# Patient Record
Sex: FEMALE | Race: WHITE | Employment: OTHER | URBAN - METROPOLITAN AREA
[De-identification: names, ages, dates, MRNs, and addresses within clinical notes are randomized per-mention and may not be internally consistent; named-entity substitution may affect disease eponyms.]

---

## 2017-01-17 ENCOUNTER — TELEPHONE (OUTPATIENT)
Dept: INTERNAL MEDICINE CLINIC | Age: 62
End: 2017-01-17

## 2017-01-17 NOTE — TELEPHONE ENCOUNTER
Reviewed discharge summary from hospitalization at [facility] on 1/13/2017. Patient was discharged on both Effexor and Zyprexa. She was given a prescription for Zyprexa 30 tablets with 2 refills. Not sure what she is requesting. Please clarify.

## 2017-01-17 NOTE — TELEPHONE ENCOUNTER
Pt called asking to speak to a nurse. Says she needs to give update on her condition and will only give to a nurse.

## 2017-01-17 NOTE — TELEPHONE ENCOUNTER
Spoke with pt, she stated that she is trying to gain her weight back by drinking boost daily. She can not get in to see psychiatrist because they are all book. She would like to continue to take Huber Slaughter , she believe it helped her when she was in the hospital. She stated she want to get her life back.

## 2017-01-18 RX ORDER — OLANZAPINE 5 MG/1
5 TABLET ORAL
Qty: 30 TAB | Refills: 3 | Status: SHIPPED | OUTPATIENT
Start: 2017-01-18 | End: 2017-03-28 | Stop reason: SDUPTHER

## 2017-02-09 RX ORDER — OMEPRAZOLE 20 MG/1
20 CAPSULE, DELAYED RELEASE ORAL DAILY
Qty: 30 CAP | Refills: 3 | Status: SHIPPED | OUTPATIENT
Start: 2017-02-09 | End: 2017-06-23 | Stop reason: SDUPTHER

## 2017-03-28 RX ORDER — OLANZAPINE 5 MG/1
5 TABLET ORAL
Qty: 30 TAB | Refills: 3 | Status: SHIPPED | OUTPATIENT
Start: 2017-03-28 | End: 2018-01-21 | Stop reason: ALTCHOICE

## 2017-03-29 ENCOUNTER — TELEPHONE (OUTPATIENT)
Dept: INTERNAL MEDICINE CLINIC | Age: 62
End: 2017-03-29

## 2017-04-04 ENCOUNTER — TELEPHONE (OUTPATIENT)
Dept: INTERNAL MEDICINE CLINIC | Age: 62
End: 2017-04-04

## 2017-04-04 DIAGNOSIS — E55.9 VITAMIN D INSUFFICIENCY: ICD-10-CM

## 2017-04-04 DIAGNOSIS — E78.5 DYSLIPIDEMIA: ICD-10-CM

## 2017-04-04 DIAGNOSIS — I10 ESSENTIAL HYPERTENSION: ICD-10-CM

## 2017-04-04 DIAGNOSIS — Z00.00 ROUTINE GENERAL MEDICAL EXAMINATION AT A HEALTH CARE FACILITY: Primary | ICD-10-CM

## 2017-04-05 ENCOUNTER — TELEPHONE (OUTPATIENT)
Dept: INTERNAL MEDICINE CLINIC | Age: 62
End: 2017-04-05

## 2017-04-14 ENCOUNTER — TELEPHONE (OUTPATIENT)
Dept: INTERNAL MEDICINE CLINIC | Age: 62
End: 2017-04-14

## 2017-04-14 NOTE — TELEPHONE ENCOUNTER
Call from Malcom Saunders at [facility].  Pt is there they are req lab order to be faxed to them at [phone], Natalie Mariscal

## 2017-05-04 ENCOUNTER — TELEPHONE (OUTPATIENT)
Dept: INTERNAL MEDICINE CLINIC | Age: 62
End: 2017-05-04

## 2017-05-04 RX ORDER — FLUCONAZOLE 150 MG/1
150 TABLET ORAL DAILY
Qty: 1 TAB | Refills: 0 | Status: SHIPPED | OUTPATIENT
Start: 2017-05-04 | End: 2017-05-05

## 2017-05-04 NOTE — TELEPHONE ENCOUNTER
Patient stating she has a yeast infection and wants to know if something can be called. She does not want to see Dionne Salguero for this unless she absolutely has to.

## 2017-05-04 NOTE — TELEPHONE ENCOUNTER
Prescription for fluconazole x 1 dose sent to [facility]. Please have patient come in for appointment if no improvement following treatment. Maryse Becerra

## 2017-05-04 NOTE — TELEPHONE ENCOUNTER
LM notifying that new script was sent to [facility] and call if she doesn't have improvement.

## 2017-05-15 RX ORDER — VENLAFAXINE 75 MG/1
75 TABLET ORAL 3 TIMES DAILY
Status: CANCELLED | OUTPATIENT
Start: 2017-05-15

## 2017-05-15 NOTE — TELEPHONE ENCOUNTER
Pt stated her counselor  has prescribed this but needs a refill and was told to call her pcp for refill.   Pt has appt with counselor in June will call to see if that doctor can refill, if not will call our office back

## 2017-05-15 NOTE — TELEPHONE ENCOUNTER
Please clarify with pharmacy the current dosing of her Effexor as prescribed by her counselor. [facility] ED records from 1/2017 list her on the extended release form (75 mg) 3 tablets in the morning.

## 2017-05-15 NOTE — TELEPHONE ENCOUNTER
Pt called back, she cant get in touch with her consoler is out of meds and don't want to miss a dose. Can we call into pharmacy?

## 2017-05-16 RX ORDER — VENLAFAXINE HYDROCHLORIDE 75 MG/1
225 CAPSULE, EXTENDED RELEASE ORAL DAILY
Qty: 90 CAP | Refills: 2 | Status: SHIPPED | OUTPATIENT
Start: 2017-05-16 | End: 2018-07-18

## 2017-05-22 RX ORDER — ALPRAZOLAM 0.25 MG/1
0.25 TABLET ORAL
Qty: 30 TAB | Refills: 0 | OUTPATIENT
Start: 2017-05-22 | End: 2017-06-20 | Stop reason: SDUPTHER

## 2017-05-22 NOTE — TELEPHONE ENCOUNTER
Reviewed report generated by the [facility]. Does not demonstrate aberrancies or inconsistencies with regard to the prescribing of controlled medications to this patient by other providers. Last filled 12/1/2016 as per .

## 2017-05-30 ENCOUNTER — TELEPHONE (OUTPATIENT)
Dept: INTERNAL MEDICINE CLINIC | Age: 62
End: 2017-05-30

## 2017-06-01 ENCOUNTER — TELEPHONE (OUTPATIENT)
Dept: INTERNAL MEDICINE CLINIC | Age: 62
End: 2017-06-01

## 2017-06-20 ENCOUNTER — OFFICE VISIT (OUTPATIENT)
Dept: INTERNAL MEDICINE CLINIC | Age: 62
End: 2017-06-20

## 2017-06-20 ENCOUNTER — TELEPHONE (OUTPATIENT)
Dept: INTERNAL MEDICINE CLINIC | Age: 62
End: 2017-06-20

## 2017-06-20 DIAGNOSIS — F33.42 RECURRENT MAJOR DEPRESSIVE DISORDER, IN FULL REMISSION (HCC): ICD-10-CM

## 2017-06-20 DIAGNOSIS — K21.9 GASTROESOPHAGEAL REFLUX DISEASE, ESOPHAGITIS PRESENCE NOT SPECIFIED: ICD-10-CM

## 2017-06-20 DIAGNOSIS — E78.5 DYSLIPIDEMIA: ICD-10-CM

## 2017-06-20 DIAGNOSIS — Z72.51 UNPROTECTED SEXUAL INTERCOURSE: ICD-10-CM

## 2017-06-20 DIAGNOSIS — R73.09 ABNORMAL GLUCOSE: ICD-10-CM

## 2017-06-20 DIAGNOSIS — C67.9 MALIGNANT NEOPLASM OF URINARY BLADDER, UNSPECIFIED SITE (HCC): ICD-10-CM

## 2017-06-20 DIAGNOSIS — I10 ESSENTIAL HYPERTENSION: Primary | ICD-10-CM

## 2017-06-20 DIAGNOSIS — M85.89 OSTEOPENIA OF MULTIPLE SITES: ICD-10-CM

## 2017-06-20 DIAGNOSIS — Z23 ENCOUNTER FOR IMMUNIZATION: ICD-10-CM

## 2017-06-20 LAB
ANION GAP SERPL CALC-SCNC: 15 MMOL/L
BUN SERPL-MCNC: 9 MG/DL (ref 6–22)
CALCIUM SERPL-MCNC: 9.6 MG/DL (ref 8.4–10.5)
CHLORIDE SERPL-SCNC: 100 MMOL/L (ref 98–110)
CO2 SERPL-SCNC: 25 MMOL/L (ref 20–32)
CREAT SERPL-MCNC: 0.7 MG/DL (ref 0.8–1.4)
GFRAA, 66117: >60
GFRNA, 66118: >60
GLUCOSE SERPL-MCNC: 89 MG/DL (ref 65–99)
POTASSIUM SERPL-SCNC: 4.4 MMOL/L (ref 3.5–5.5)
SODIUM SERPL-SCNC: 140 MMOL/L (ref 133–145)

## 2017-06-20 RX ORDER — ALPRAZOLAM 0.25 MG/1
0.25 TABLET ORAL
Qty: 30 TAB | Refills: 0 | Status: SHIPPED | OUTPATIENT
Start: 2017-06-20 | End: 2017-08-07 | Stop reason: SDUPTHER

## 2017-06-20 NOTE — MR AVS SNAPSHOT
Visit Information Date & Time Provider Department Dept. Phone Encounter #  
 6/20/2017 12:00 PM Karen Mead MD Internist of [facility] [phone] [id number] Follow-up Instructions Return in about 6 months (around 12/20/2017), or if symptoms worsen or fail to improve. Your Appointments 12/21/2017 11:00 AM  
Office Visit with Kaern Mead MD  
Internist of [facility] Appt Note: 6 months [address], [address] [place]  
  
   
 [address], [address] Upcoming Health Maintenance Date Due Pneumococcal 19-64 Highest Risk (1 of 3 - PCV13) 10/18/1974 DTaP/Tdap/Td series (1 - Tdap) 2/27/2015 INFLUENZA AGE 9 TO ADULT 8/1/2017 BREAST CANCER SCRN MAMMOGRAM 3/15/2018 PAP AKA CERVICAL CYTOLOGY 9/14/2018 COLONOSCOPY 4/23/2019 Allergies as of 6/20/2017  Review Complete On: 6/20/2017 By: Real Zarco LPN Severity Noted Reaction Type Reactions Codeine    Nausea and Vomiting Macrobid [Nitrofurantoin Monohyd/m-cryst]  09/04/2015    Nausea and Vomiting Current Immunizations  Reviewed on 5/13/2016 Name Date PPD 9/6/2011 TD Vaccine 7/27/2007 Td 2/26/2015 Tdap 6/20/2017 Zoster Vaccine, Live 5/13/2016  3:20 PM  
  
 Not reviewed this visit You Were Diagnosed With   
  
 Codes Comments Encounter for immunization    -  Primary ICD-10-CM: N33 ICD-9-CM: V03.89 Abnormal glucose     ICD-10-CM: R73.09 
ICD-9-CM: 790.29 Vitals BP Pulse Temp Resp Height(growth percentile) Weight(growth percentile) 140/88 (BP 1 Location: Left arm, BP Patient Position: Sitting) 78 98.4 °F (36.9 °C) (Oral) 18 5' 6\" (1.676 m) 168 lb (76.2 kg) SpO2 BMI OB Status Smoking Status 99% 27.12 kg/m2 Postmenopausal Never Smoker Vitals History BMI and BSA Data  Body Mass Index Body Surface Area  
 27.12 kg/m 2 1.88 m 2  
  
 Preferred Pharmacy Pharmacy Name Phone [facility] [address] [facility], [address] [place] [address] [phone] Your Updated Medication List  
  
   
This list is accurate as of: 6/20/17  1:02 PM.  Always use your most recent med list.  
  
  
  
  
 ALPRAZolam 0.25 mg tablet Commonly known as:  Pama Adryan Take 1 Tab by mouth three (3) times daily as needed for Anxiety. Max Daily Amount: 0.75 mg.  
  
 chromium-herb no.238 250-775 mcg-mg Tab Take  by mouth.  
  
 ciclopirox 8 % solution Commonly known as:  PENLAC Apply to toenail every bedtime  
  
 lisinopril 2.5 mg tablet Commonly known as:  PRINIVIL, ZESTRIL  
TAKE ONE TABLET BY MOUTH EVERY DAY  
  
 OLANZapine 5 mg tablet Commonly known as:  ZyPREXA Take 1 Tab by mouth nightly. omeprazole 20 mg capsule Commonly known as:  PRILOSEC Take 1 Cap by mouth daily. red yeast rice extract 600 mg Cap Take 600 mg by mouth now. venlafaxine-SR 75 mg capsule Commonly known as:  EFFEXOR-XR Take 3 Caps by mouth daily. Prescriptions Printed Refills ALPRAZolam (XANAX) 0.25 mg tablet 0 Sig: Take 1 Tab by mouth three (3) times daily as needed for Anxiety. Max Daily Amount: 0.75 mg. Class: Print Route: Oral  
  
We Performed the Following TETANUS, DIPHTHERIA TOXOIDS AND ACELLULAR PERTUSSIS VACCINE (TDAP), IN INDIVIDS. >=7, IM [id number] CPT(R)] Follow-up Instructions Return in about 6 months (around 12/20/2017), or if symptoms worsen or fail to improve. To-Do List   
 06/20/2017 Lab:  HEMOGLOBIN A1C WITH EAG   
  
 06/20/2017 Lab:  METABOLIC PANEL, BASIC Patient Instructions Please monitor and record your blood pressure every morning. Please notify office if greater than 140/90. Prediabetes: Care Instructions Your Care Instructions Prediabetes is a warning sign that you are at risk for getting type 2 diabetes. It means that your blood sugar is higher than it should be. The food you eat turns into sugar, which your body uses for energy. Normally, an organ called the pancreas makes insulin, which allows the sugar in your blood to get into your body's cells. But when your body can't use insulin the right way, the sugar doesn't move into cells. It stays in your blood instead. This is called insulin resistance. The buildup of sugar in the blood causes prediabetes. The good news is that lifestyle changes may help you get your blood sugar back to normal and help you avoid or delay diabetes. Follow-up care is a key part of your treatment and safety. Be sure to make and go to all appointments, and call your doctor if you are having problems. It's also a good idea to know your test results and keep a list of the medicines you take. How can you care for yourself at home? · Watch your weight. A healthy weight helps your body use insulin properly. · Limit the amount of calories, sweets, and unhealthy fat you eat. Ask your doctor if you should see a dietitian. A registered dietitian can help you create meal plans that fit your lifestyle. · Get at least 30 minutes of exercise on most days of the week. Exercise helps control your blood sugar. It also helps you maintain a healthy weight. Walking is a good choice. You also may want to do other activities, such as running, swimming, cycling, or playing tennis or team sports. · Do not smoke. Smoking can make prediabetes worse. If you need help quitting, talk to your doctor about stop-smoking programs and medicines. These can increase your chances of quitting for good. · If your doctor prescribed medicines, take them exactly as prescribed. Call your doctor if you think you are having a problem with your medicine. You will get more details on the specific medicines your doctor prescribes. When should you call for help? Watch closely for changes in your health, and be sure to contact your doctor if: 
· You have any symptoms of diabetes. These may include: ¨ Being thirsty more often. ¨ Urinating more. ¨ Being hungrier. ¨ Losing weight. ¨ Being very tired. ¨ Having blurry vision. · You have a wound that will not heal. 
· You have an infection that will not go away. · You have problems with your blood pressure. · You want more information about diabetes and how you can keep from getting it. Where can you learn more? Go to http://kasey-yamilet.info/. Enter I222 in the search box to learn more about \"Prediabetes: Care Instructions. \" Current as of: March 13, 2017 Content Version: 11.3 © 1186-3861 [employer]. Care instructions adapted under license by [employer] (which disclaims liability or warranty for this information). If you have questions about a medical condition or this instruction, always ask your healthcare professional. [place] [address] any warranty or liability for your use of this information. Introducing [facility]! [facility] introduces [employer] patient portal. Now you can access parts of your medical record, email your doctor's office, and request medication refills online. 1. In your internet browser, go to [email or web address] 2. Click on the First Time User? Click Here link in the Sign In box. You will see the New Member Sign Up page. 3. Enter your [employer] Access Code exactly as it appears below. You will not need to use this code after youve completed the sign-up process. If you do not sign up before the expiration date, you must request a new code. · [employer] Access Code: [email or web address] Expires: 9/18/2017  1:02 PM 
 
4. Enter the last four digits of your Social Security Number (xxxx) and Date of Birth (mm/dd/yyyy) as indicated and click Submit. You will be taken to the next sign-up page. 5. Create a [employer] ID. This will be your [employer] login ID and cannot be changed, so think of one that is secure and easy to remember. 6. Create a [employer] password. You can change your password at any time. 7. Enter your Password Reset Question and Answer. This can be used at a later time if you forget your password. 8. Enter your e-mail address. You will receive e-mail notification when new information is available in [address] 9. Click Sign Up. You can now view and download portions of your medical record. 10. Click the Download Summary menu link to download a portable copy of your medical information. If you have questions, please visit the Frequently Asked Questions section of the [employer] website. Remember, [employer] is NOT to be used for urgent needs. For medical emergencies, dial 911. Now available from your iPhone and Android! Please provide this summary of care documentation to your next provider. Your primary care clinician is listed as Scot Vincent. If you have any questions after today's visit, please call [phone].

## 2017-06-20 NOTE — PATIENT INSTRUCTIONS
Please monitor and record your blood pressure every morning. Please notify office if greater than 140/90. Prediabetes: Care Instructions  Your Care Instructions  Prediabetes is a warning sign that you are at risk for getting type 2 diabetes. It means that your blood sugar is higher than it should be. The food you eat turns into sugar, which your body uses for energy. Normally, an organ called the pancreas makes insulin, which allows the sugar in your blood to get into your body's cells. But when your body can't use insulin the right way, the sugar doesn't move into cells. It stays in your blood instead. This is called insulin resistance. The buildup of sugar in the blood causes prediabetes. The good news is that lifestyle changes may help you get your blood sugar back to normal and help you avoid or delay diabetes. Follow-up care is a key part of your treatment and safety. Be sure to make and go to all appointments, and call your doctor if you are having problems. It's also a good idea to know your test results and keep a list of the medicines you take. How can you care for yourself at home? · Watch your weight. A healthy weight helps your body use insulin properly. · Limit the amount of calories, sweets, and unhealthy fat you eat. Ask your doctor if you should see a dietitian. A registered dietitian can help you create meal plans that fit your lifestyle. · Get at least 30 minutes of exercise on most days of the week. Exercise helps control your blood sugar. It also helps you maintain a healthy weight. Walking is a good choice. You also may want to do other activities, such as running, swimming, cycling, or playing tennis or team sports. · Do not smoke. Smoking can make prediabetes worse. If you need help quitting, talk to your doctor about stop-smoking programs and medicines. These can increase your chances of quitting for good. · If your doctor prescribed medicines, take them exactly as prescribed. Call your doctor if you think you are having a problem with your medicine. You will get more details on the specific medicines your doctor prescribes. When should you call for help? Watch closely for changes in your health, and be sure to contact your doctor if:  · You have any symptoms of diabetes. These may include:  ¨ Being thirsty more often. ¨ Urinating more. ¨ Being hungrier. ¨ Losing weight. ¨ Being very tired. ¨ Having blurry vision. · You have a wound that will not heal.  · You have an infection that will not go away. · You have problems with your blood pressure. · You want more information about diabetes and how you can keep from getting it. Where can you learn more? Go to http://kasey-yamilet.info/. Enter I222 in the search box to learn more about \"Prediabetes: Care Instructions. \"  Current as of: March 13, 2017  Content Version: 11.3  © 9491-1220 [employer]. Care instructions adapted under license by [employer] (which disclaims liability or warranty for this information). If you have questions about a medical condition or this instruction, always ask your healthcare professional. [place] [address] any warranty or liability for your use of this information.

## 2017-06-20 NOTE — TELEPHONE ENCOUNTER
Pt needs lab mailed to her for appt in 6 months.  Goes to [facility]

## 2017-06-21 ENCOUNTER — TELEPHONE (OUTPATIENT)
Dept: INTERNAL MEDICINE CLINIC | Age: 62
End: 2017-06-21

## 2017-06-21 LAB
ANION GAP SERPL CALC-SCNC: 15 MMOL/L
AVG GLU, 10930: 114 MG/DL (ref 91–123)
BUN SERPL-MCNC: 9 MG/DL (ref 6–22)
CALCIUM SERPL-MCNC: 9.6 MG/DL (ref 8.4–10.5)
CHLORIDE SERPL-SCNC: 100 MMOL/L (ref 98–110)
CO2 SERPL-SCNC: 25 MMOL/L (ref 20–32)
CREAT SERPL-MCNC: 0.7 MG/DL (ref 0.8–1.4)
GFRAA, 66117: >60
GFRNA, 66118: >60
GLUCOSE SERPL-MCNC: 89 MG/DL (ref 65–99)
HBA1C MFR BLD HPLC: 5.6 % (ref 4.8–5.9)
POTASSIUM SERPL-SCNC: 4.4 MMOL/L (ref 3.5–5.5)
SODIUM SERPL-SCNC: 140 MMOL/L (ref 133–145)

## 2017-06-21 NOTE — TELEPHONE ENCOUNTER
Please let the patient know that the blood work drawn following her visit showed a normal blood sugar level and HbA1c was normal. She does have a tendency to have a high fasting blood sugar, however, as a review of her labs showed it to be mildly elevated for the last 3 years. She should try to limit her carbohydrate intake.

## 2017-06-23 RX ORDER — OMEPRAZOLE 20 MG/1
CAPSULE, DELAYED RELEASE ORAL
Qty: 30 CAP | Refills: 0 | Status: SHIPPED | OUTPATIENT
Start: 2017-06-23 | End: 2017-07-25 | Stop reason: SDUPTHER

## 2017-06-24 ENCOUNTER — TELEPHONE (OUTPATIENT)
Dept: INTERNAL MEDICINE CLINIC | Age: 62
End: 2017-06-24

## 2017-06-24 VITALS
OXYGEN SATURATION: 99 % | BODY MASS INDEX: 27 KG/M2 | SYSTOLIC BLOOD PRESSURE: 132 MMHG | WEIGHT: 168 LBS | RESPIRATION RATE: 18 BRPM | TEMPERATURE: 98.4 F | HEIGHT: 66 IN | HEART RATE: 78 BPM | DIASTOLIC BLOOD PRESSURE: 58 MMHG
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
## 2017-07-24 ENCOUNTER — TELEPHONE (OUTPATIENT)
Dept: INTERNAL MEDICINE CLINIC | Age: 62
End: 2017-07-24

## 2017-07-24 ENCOUNTER — OFFICE VISIT (OUTPATIENT)
Dept: INTERNAL MEDICINE CLINIC | Age: 62
End: 2017-07-24

## 2017-07-24 VITALS
SYSTOLIC BLOOD PRESSURE: 118 MMHG | OXYGEN SATURATION: 98 % | RESPIRATION RATE: 14 BRPM | WEIGHT: 157.6 LBS | BODY MASS INDEX: 25.33 KG/M2 | DIASTOLIC BLOOD PRESSURE: 78 MMHG | HEIGHT: 66 IN | TEMPERATURE: 98.5 F | HEART RATE: 82 BPM

## 2017-07-24 DIAGNOSIS — N30.01 ACUTE CYSTITIS WITH HEMATURIA: Primary | ICD-10-CM

## 2017-07-24 DIAGNOSIS — Z72.51 UNPROTECTED SEXUAL INTERCOURSE: ICD-10-CM

## 2017-07-24 DIAGNOSIS — N89.8 VAGINAL DISCHARGE: ICD-10-CM

## 2017-07-24 LAB
BILIRUB UR QL STRIP: NORMAL
GLUCOSE UR-MCNC: NEGATIVE MG/DL
KETONES P FAST UR STRIP-MCNC: NORMAL MG/DL
PH UR STRIP: 5.5 [PH] (ref 4.6–8)
PROT UR QL STRIP: NORMAL MG/DL
SP GR UR STRIP: 1.03 (ref 1–1.03)
UA UROBILINOGEN AMB POC: NORMAL (ref 0.2–1)
URINALYSIS CLARITY POC: CLEAR
URINALYSIS COLOR POC: YELLOW
URINE BLOOD POC: NORMAL
URINE LEUKOCYTES POC: NORMAL
URINE NITRITES POC: NEGATIVE

## 2017-07-24 RX ORDER — FLUCONAZOLE 150 MG/1
150 TABLET ORAL DAILY
Qty: 1 TAB | Refills: 0 | Status: SHIPPED | OUTPATIENT
Start: 2017-07-24 | End: 2017-07-25

## 2017-07-24 RX ORDER — AZITHROMYCIN 500 MG/1
1000 TABLET, FILM COATED ORAL DAILY
Qty: 2 TAB | Refills: 0 | Status: SHIPPED | OUTPATIENT
Start: 2017-07-24 | End: 2017-07-25

## 2017-07-24 RX ORDER — SULFAMETHOXAZOLE AND TRIMETHOPRIM 800; 160 MG/1; MG/1
1 TABLET ORAL 2 TIMES DAILY
Qty: 6 TAB | Refills: 0 | Status: CANCELLED | OUTPATIENT
Start: 2017-07-24 | End: 2017-07-27

## 2017-07-24 RX ORDER — TERCONAZOLE 4 MG/G
1 CREAM VAGINAL
COMMUNITY
End: 2017-07-24 | Stop reason: ALTCHOICE

## 2017-07-24 RX ORDER — LEVOFLOXACIN 500 MG/1
500 TABLET, FILM COATED ORAL DAILY
Qty: 7 TAB | Refills: 0 | Status: SHIPPED | OUTPATIENT
Start: 2017-07-24 | End: 2017-07-31

## 2017-07-24 NOTE — MR AVS SNAPSHOT
Visit Information Date & Time Provider Department Dept. Phone Encounter #  
 7/24/2017  3:00 PM Duncan Wang NP Internist of [address] [id number] Follow-up Instructions Return if symptoms worsen or fail to improve. Your Appointments 12/21/2017 11:00 AM  
Office Visit with Trinidad Giang MD  
Internist of [place] [id number] [facility]) Appt Note: 6 months [address] [facility], [address] [facility] [address] [address] [place]  
  
   
 [address], [address] Upcoming Health Maintenance Date Due Pneumococcal 19-64 Highest Risk (1 of 3 - PCV13) 10/18/1974 INFLUENZA AGE 9 TO ADULT 8/1/2017 BREAST CANCER SCRN MAMMOGRAM 3/15/2018 PAP AKA CERVICAL CYTOLOGY 9/14/2018 COLONOSCOPY 4/23/2019 DTaP/Tdap/Td series (2 - Td) 6/20/2027 Allergies as of 7/24/2017  Review Complete On: 7/24/2017 By: Duncan Wang NP Severity Noted Reaction Type Reactions Codeine    Nausea and Vomiting Macrobid [Nitrofurantoin Monohyd/m-cryst]  09/04/2015    Nausea and Vomiting Current Immunizations  Reviewed on 5/13/2016 Name Date PPD 9/6/2011 TD Vaccine 7/27/2007 Td 2/26/2015 Tdap 6/20/2017 Zoster Vaccine, Live 5/13/2016  3:20 PM  
  
 Not reviewed this visit You Were Diagnosed With   
  
 Codes Comments Vaginal discharge     ICD-10-CM: N89.8 ICD-9-CM: 623.5 Unprotected sexual intercourse     ICD-10-CM: Z72.51 
ICD-9-CM: V69.2 Vitals BP Pulse Temp Resp Height(growth percentile) Weight(growth percentile) 118/78 (BP 1 Location: Left arm, BP Patient Position: Sitting) 82 98.5 °F (36.9 °C) (Oral) 14 5' 6\" (1.676 m) 157 lb 9.6 oz (71.5 kg) SpO2 BMI OB Status Smoking Status 98% 25.44 kg/m2 Postmenopausal Never Smoker Vitals History BMI and BSA Data Body Mass Index Body Surface Area  
 25.44 kg/m 2 1.82 m 2 Preferred Pharmacy Pharmacy Name Phone [facility] [address] [facility], [address] [place] [address] [phone] Your Updated Medication List  
  
   
This list is accurate as of: 7/24/17  4:12 PM.  Always use your most recent med list.  
  
  
  
  
 ALPRAZolam 0.25 mg tablet Commonly known as:  Jestine Pies Take 1 Tab by mouth three (3) times daily as needed for Anxiety. Max Daily Amount: 0.75 mg.  
  
 azithromycin 500 mg Tab Commonly known as:  Morrie Cables Take 2 Tabs by mouth daily for 1 dose. Indications: ACUTE GONOCOCCAL CERVICITIS, Chlamydia Cervicitis  
  
 chromium-herb no.238 250-775 mcg-mg Tab Take  by mouth.  
  
 ciclopirox 8 % solution Commonly known as:  PENLAC Apply to toenail every bedtime  
  
 fluconazole 150 mg tablet Commonly known as:  DIFLUCAN Take 1 Tab by mouth daily for 1 day. FDA advises cautious prescribing of oral fluconazole in pregnancy. Indications: PREVENTION OF VULVOVAGINAL CANDIDIASIS, VULVOVAGINAL CANDIDIASIS  
  
 levoFLOXacin 500 mg tablet Commonly known as:  Moisés Irvine Take 1 Tab by mouth daily for 7 days. Indications: BACTERIAL URINARY TRACT INFECTION  
  
 lisinopril 2.5 mg tablet Commonly known as:  PRINIVIL, ZESTRIL  
TAKE ONE TABLET BY MOUTH EVERY DAY  
  
 OLANZapine 5 mg tablet Commonly known as:  ZyPREXA Take 1 Tab by mouth nightly. omeprazole 20 mg capsule Commonly known as:  PRILOSEC  
TAKE 1 CAPSULE BY MOUTH DAILY  
  
 red yeast rice extract 600 mg Cap Take 600 mg by mouth now. TERAZOL 7 0.4 % vaginal cream  
Generic drug:  terconazole Insert 1 Applicator into vagina nightly. venlafaxine-SR 75 mg capsule Commonly known as:  EFFEXOR-XR Take 3 Caps by mouth daily. Prescriptions Printed Refills  
 fluconazole (DIFLUCAN) 150 mg tablet 0 Sig: Take 1 Tab by mouth daily for 1 day. FDA advises cautious prescribing of oral fluconazole in pregnancy.   Indications: PREVENTION OF VULVOVAGINAL CANDIDIASIS, VULVOVAGINAL CANDIDIASIS Class: Print Route: Oral  
  
Prescriptions Sent to Pharmacy Refills  
 levoFLOXacin (LEVAQUIN) 500 mg tablet 0 Sig: Take 1 Tab by mouth daily for 7 days. Indications: BACTERIAL URINARY TRACT INFECTION Class: Normal  
 Pharmacy: "[facility]" [address] [place] [address] Ph #: [phone] Route: Oral  
 azithromycin (ZITHROMAX) 500 mg tab 0 Sig: Take 2 Tabs by mouth daily for 1 dose. Indications: ACUTE GONOCOCCAL CERVICITIS, Chlamydia Cervicitis Class: Normal  
 Pharmacy: "[facility]" [address] [place] [address] Ph #: [phone] Route: Oral  
  
We Performed the Following AMB POC URINALYSIS DIP STICK AUTO W/ MICRO [69968 CPT(R)] CHLAMYDIA/GC PCR [53108 CPT(R)] CULTURE, URINE [id number] CPT(R)] HEP B SURFACE AG [id number] CPT(R)] HEPATITIS C AB [55743 CPT(R)] HIV 1/2 AG/AB, 4TH GENERATION,W RFLX CONFIRM [[id number] Custom] T PALLIDUM AB [[id number] Custom] Follow-up Instructions Return if symptoms worsen or fail to improve. To-Do List   
 07/24/2017 Lab:  CHLAMYDIA/GC PCR   
  
 07/24/2017 Microbiology:  CULTURE, URINE   
  
 07/24/2017 Lab:  HEP B SURFACE AG   
  
 07/24/2017 Lab:  HEPATITIS C AB   
  
 07/24/2017 Lab:  T PALLIDUM AB Patient Instructions Chlamydia: Care Instructions Your Care Instructions Chlamydia is a bacterial infection spread through sexual contact. It is one of the most common sexually transmitted infections (STIs). Most people who get chlamydia do not have symptoms, but they can still infect their sex partners. If chlamydia in women is not treated, it can cause pelvic inflammatory disease (PID), a severe pelvic infection. PID can make it hard for a woman to get pregnant. Antibiotics can cure chlamydia.  Both sex partners need treatment to keep from passing the infection back and forth. Certain antibiotics should not be used in pregnancy. If you were not tested for pregnancy during this visit, tell your doctor if you might be pregnant. Follow-up care is a key part of your treatment and safety. Be sure to make and go to all appointments, and call your doctor if you are having problems. Its also a good idea to know your test results and keep a list of the medicines you take. How can you care for yourself at home? · Chlamydia often is treated with a single dose of antibiotics in the doctor's office. If your doctor prescribed antibiotics to take at home, take them as directed. Do not stop taking them just because you feel better. You need to take the full course of antibiotics. · Do not have sex with anyone while you are being treated. If your treatment is a single dose of antibiotics, wait at least 7 days after you take the dose before you have sex. Even if you use a condom, you and your partner may pass the infection back and forth. · Make sure to tell your sex partner or partners that you have chlamydia. They should get treated, even if they do not have symptoms. · Your doctor may have done tests for other STIs. If so, call back in 3 or 4 days for those results. · Your doctor may advise you to be tested again for chlamydia in 3 or 4 months. How can you prevent chlamydia and other STIs in the future? · Use latex condoms every time you have sex. Use them from the beginning to the end of sexual contact. · Talk to your partner before you have sex. Find out if he or she has or is at risk for chlamydia or any other STI. Keep in mind that a person may be able to spread an STI even if he or she does not have symptoms. · Do not have sex while you are being treated for chlamydia or any other STI. · Do not have sex with anyone who has symptoms of an STI, such as sores on the genitals or mouth. · Having one sex partner (who does not have STIs and does not have sex with anyone else) is a good way to avoid STIs. When should you call for help? Call 911 anytime you think you may need emergency care. For example, call if: 
· You have sudden, severe pain in your belly or pelvis. Call your doctor now or seek immediate medical care if: 
· You have new belly or pelvic pain. · You have a fever. · You have new or increased burning or pain with urination, or you cannot urinate. · You have pain, swelling, or tenderness in the scrotum. Watch closely for changes in your health, and be sure to contact your doctor if: 
· You have unusual vaginal bleeding. · You have a discharge from the vagina or penis. · You think you may have been exposed to another STI. · Your symptoms get worse or have not improved within 1 week after starting treatment. · You have any new symptoms, such as sores, bumps, rashes, blisters, or warts in the genital or anal area. Where can you learn more? Go to http://kasey-yamilet.info/. Enter A490 in the search box to learn more about \"Chlamydia: Care Instructions. \" Current as of: March 20, 2017 Content Version: 11.3 © 5633-1881 Percentil. Care instructions adapted under license by SideStep (which disclaims liability or warranty for this information). If you have questions about a medical condition or this instruction, always ask your healthcare professional. [place] [address] any warranty or liability for your use of this information. Gonorrhea: Care Instructions Your Care Instructions Gonorrhea is a bacterial infection spread through sexual contact (sexually transmitted infection, or STI). It is found most often in the genital area but it can also infect other areas of the body, such as the rectum or throat.  While most people with gonorrhea develop symptoms within a few days after infection, some people have no symptoms. Symptoms of gonorrhea include abnormal bleeding, pain or burning during urination, or a thick discharge from the vagina or penis. Antibiotics can cure gonorrhea. Both sex partners need to be treated to keep from passing the infection back and forth. Follow-up care is a key part of your treatment and safety. Be sure to make and go to all appointments, and call your doctor if you are having problems. Its also a good idea to know your test results and keep a list of the medicines you take. How can you care for yourself at home? · Your doctor probably gave you a shot of antibiotics. If your doctor prescribed antibiotic pills, take them as directed. Do not stop taking them just because you feel better. You need to take the full course of antibiotics. · Do not have sexual contact with anyone while you are being treated. If your treatment was a single dose of antibiotics, wait at least 7 days after taking the dose before you have any sexual contact. Even if you use a condom, you may pass the infection back and forth. · Wash your hands if you touch an area of gonorrhea infection. This will help prevent spreading the infection to other parts of your body or to other people. · Tell your sex partner or partners that you have gonorrhea. They should get treated, whether or not they have symptoms of infection. · Talk to your doctor about being tested again for gonorrhea in 3 months. To prevent gonorrhea in the future · Use latex condoms every time you have sex. Use them from the beginning to the end of sexual contact. · Talk to your partner before you have sex. Find out if he or she has or is at risk for gonorrhea or any other STI. Keep in mind that a person may be able to spread an STI even if he or she does not have symptoms. · Do not have sex if you are being treated for gonorrhea or any other STI. · Do not have sex with anyone who has symptoms of an STI, such as sores on the genitals or mouth. · Having one sex partner (who does not have STIs and does not have sex with anyone else) is a good way to avoid STIs. When should you call for help? Call 911 anytime you think you may need emergency care. For example, call if: 
· You have sudden, severe pain in your belly or pelvis. Call your doctor now or seek immediate medical care if: 
· You have new belly or pelvic pain. · You have unusual vaginal bleeding. · You have a fever. · You have a discharge from the vagina or penis. · You have new or increased burning or pain with urination, or you cannot urinate. · You have pain, swelling, or tenderness in the scrotum. · You have joint pain. · You have pus coming from your eyes. Watch closely for changes in your health, and be sure to contact your doctor if: 
· You think you may have been exposed to another STI. · Your symptoms get worse or have not improved within 1 week after starting treatment. · You have any new symptoms, such as sores, bumps, rashes, blisters, or warts in the genital or anal area. · You have a new skin rash. Where can you learn more? Go to http://kasey-yamilet.info/. Enter H725 in the search box to learn more about \"Gonorrhea: Care Instructions. \" Current as of: March 20, 2017 Content Version: 11.3 © 8225-4900 [employer]Poynt [employer]. Care instructions adapted under license by Godigex (which disclaims liability or warranty for this information). If you have questions about a medical condition or this instruction, always ask your healthcare professional. [place] [address] any warranty or liability for your use of this information. Introducing [facility]! [employer] introduces Stackdriver patient portal. Now you can access parts of your medical record, email your doctor's office, and request medication refills online. 1. In your internet browser, go to [email or web address] 2. Click on the First Time User? Click Here link in the Sign In box. You will see the New Member Sign Up page. 3. Enter your [employer] Access Code exactly as it appears below. You will not need to use this code after youve completed the sign-up process. If you do not sign up before the expiration date, you must request a new code. · [employer] Access Code: [email or web address] Expires: 9/18/2017  1:02 PM 
 
4. Enter the last four digits of your Social Security Number (xxxx) and Date of Birth (mm/dd/yyyy) as indicated and click Submit. You will be taken to the next sign-up page. 5. Create a 17u.cnt ID. This will be your [employer] login ID and cannot be changed, so think of one that is secure and easy to remember. 6. Create a [employer] password. You can change your password at any time. 7. Enter your Password Reset Question and Answer. This can be used at a later time if you forget your password. 8. Enter your e-mail address. You will receive e-mail notification when new information is available in [address] 9. Click Sign Up. You can now view and download portions of your medical record. 10. Click the Download Summary menu link to download a portable copy of your medical information. If you have questions, please visit the Frequently Asked Questions section of the [employer] website. Remember, [employer] is NOT to be used for urgent needs. For medical emergencies, dial 911. Now available from your iPhone and Android! Please provide this summary of care documentation to your next provider. Your primary care clinician is listed as Lenora Blanco. If you have any questions after today's visit, please call [phone].

## 2017-07-24 NOTE — PATIENT INSTRUCTIONS
Chlamydia: Care Instructions  Your Care Instructions  Chlamydia is a bacterial infection spread through sexual contact. It is one of the most common sexually transmitted infections (STIs). Most people who get chlamydia do not have symptoms, but they can still infect their sex partners. If chlamydia in women is not treated, it can cause pelvic inflammatory disease (PID), a severe pelvic infection. PID can make it hard for a woman to get pregnant. Antibiotics can cure chlamydia. Both sex partners need treatment to keep from passing the infection back and forth. Certain antibiotics should not be used in pregnancy. If you were not tested for pregnancy during this visit, tell your doctor if you might be pregnant. Follow-up care is a key part of your treatment and safety. Be sure to make and go to all appointments, and call your doctor if you are having problems. Its also a good idea to know your test results and keep a list of the medicines you take. How can you care for yourself at home? · Chlamydia often is treated with a single dose of antibiotics in the doctor's office. If your doctor prescribed antibiotics to take at home, take them as directed. Do not stop taking them just because you feel better. You need to take the full course of antibiotics. · Do not have sex with anyone while you are being treated. If your treatment is a single dose of antibiotics, wait at least 7 days after you take the dose before you have sex. Even if you use a condom, you and your partner may pass the infection back and forth. · Make sure to tell your sex partner or partners that you have chlamydia. They should get treated, even if they do not have symptoms. · Your doctor may have done tests for other STIs. If so, call back in 3 or 4 days for those results. · Your doctor may advise you to be tested again for chlamydia in 3 or 4 months. How can you prevent chlamydia and other STIs in the future?   · Use latex condoms every time you have sex. Use them from the beginning to the end of sexual contact. · Talk to your partner before you have sex. Find out if he or she has or is at risk for chlamydia or any other STI. Keep in mind that a person may be able to spread an STI even if he or she does not have symptoms. · Do not have sex while you are being treated for chlamydia or any other STI. · Do not have sex with anyone who has symptoms of an STI, such as sores on the genitals or mouth. · Having one sex partner (who does not have STIs and does not have sex with anyone else) is a good way to avoid STIs. When should you call for help? Call 911 anytime you think you may need emergency care. For example, call if:  · You have sudden, severe pain in your belly or pelvis. Call your doctor now or seek immediate medical care if:  · You have new belly or pelvic pain. · You have a fever. · You have new or increased burning or pain with urination, or you cannot urinate. · You have pain, swelling, or tenderness in the scrotum. Watch closely for changes in your health, and be sure to contact your doctor if:  · You have unusual vaginal bleeding. · You have a discharge from the vagina or penis. · You think you may have been exposed to another STI. · Your symptoms get worse or have not improved within 1 week after starting treatment. · You have any new symptoms, such as sores, bumps, rashes, blisters, or warts in the genital or anal area. Where can you learn more? Go to http://kasey-yamilet.info/. Enter K999 in the search box to learn more about \"Chlamydia: Care Instructions. \"  Current as of: March 20, 2017  Content Version: 11.3  © 9192-8483 ScribeStorm. Care instructions adapted under license by [employer] (which disclaims liability or warranty for this information).  If you have questions about a medical condition or this instruction, always ask your healthcare professional. [employer] disclaims any warranty or liability for your use of this information. Gonorrhea: Care Instructions  Your Care Instructions  Gonorrhea is a bacterial infection spread through sexual contact (sexually transmitted infection, or STI). It is found most often in the genital area but it can also infect other areas of the body, such as the rectum or throat. While most people with gonorrhea develop symptoms within a few days after infection, some people have no symptoms. Symptoms of gonorrhea include abnormal bleeding, pain or burning during urination, or a thick discharge from the vagina or penis. Antibiotics can cure gonorrhea. Both sex partners need to be treated to keep from passing the infection back and forth. Follow-up care is a key part of your treatment and safety. Be sure to make and go to all appointments, and call your doctor if you are having problems. Its also a good idea to know your test results and keep a list of the medicines you take. How can you care for yourself at home? · Your doctor probably gave you a shot of antibiotics. If your doctor prescribed antibiotic pills, take them as directed. Do not stop taking them just because you feel better. You need to take the full course of antibiotics. · Do not have sexual contact with anyone while you are being treated. If your treatment was a single dose of antibiotics, wait at least 7 days after taking the dose before you have any sexual contact. Even if you use a condom, you may pass the infection back and forth. · Wash your hands if you touch an area of gonorrhea infection. This will help prevent spreading the infection to other parts of your body or to other people. · Tell your sex partner or partners that you have gonorrhea. They should get treated, whether or not they have symptoms of infection. · Talk to your doctor about being tested again for gonorrhea in 3 months.   To prevent gonorrhea in the future  · Use latex condoms every time you have sex. Use them from the beginning to the end of sexual contact. · Talk to your partner before you have sex. Find out if he or she has or is at risk for gonorrhea or any other STI. Keep in mind that a person may be able to spread an STI even if he or she does not have symptoms. · Do not have sex if you are being treated for gonorrhea or any other STI. · Do not have sex with anyone who has symptoms of an STI, such as sores on the genitals or mouth. · Having one sex partner (who does not have STIs and does not have sex with anyone else) is a good way to avoid STIs. When should you call for help? Call 911 anytime you think you may need emergency care. For example, call if:  · You have sudden, severe pain in your belly or pelvis. Call your doctor now or seek immediate medical care if:  · You have new belly or pelvic pain. · You have unusual vaginal bleeding. · You have a fever. · You have a discharge from the vagina or penis. · You have new or increased burning or pain with urination, or you cannot urinate. · You have pain, swelling, or tenderness in the scrotum. · You have joint pain. · You have pus coming from your eyes. Watch closely for changes in your health, and be sure to contact your doctor if:  · You think you may have been exposed to another STI. · Your symptoms get worse or have not improved within 1 week after starting treatment. · You have any new symptoms, such as sores, bumps, rashes, blisters, or warts in the genital or anal area. · You have a new skin rash. Where can you learn more? Go to http://kasey-yamilet.info/. Enter B311 in the search box to learn more about \"Gonorrhea: Care Instructions. \"  Current as of: March 20, 2017  Content Version: 11.3  © 9491-7848 Brainomix. Care instructions adapted under license by Wormhole (which disclaims liability or warranty for this information).  If you have questions about a medical condition or this instruction, always ask your healthcare professional. [place] [address] any warranty or liability for your use of this information.

## 2017-07-24 NOTE — PROGRESS NOTES
Perri Bowman is a 64 y.o.  female and presents with    Chief Complaint   Patient presents with    Vaginal Discharge     Patient here having discharge clear/white, smell  by 1 month patient was given terazol but the symptoms ever went away       Subjective:  HPI  Mrs. Corey Morrow presents today for clear, thin mucous discharge from the vagina that has a malodorous smell. She did have unprotected sex one time prior to symptoms and then two weeks ago. She was seen by her gynecologist (6-) for a pap and similar presenting symptoms. She was diagnosed with a vaginal yeast infection. She completed Terconazole for the 7 days but does not feel that it provided much relief. She also used Monistat for 3 days prior to the Terconazole. She would like to do a full STD panel. She denies fever, chills, rashes. Additional Concerns: no     ROS   Review of Systems   Constitutional: Negative for chills, fever, malaise/fatigue and weight loss. HENT: Negative. Eyes: Negative. Respiratory: Negative. Cardiovascular: Negative for chest pain, palpitations and leg swelling. Gastrointestinal: Negative for constipation, diarrhea, heartburn, nausea and vomiting. Genitourinary: Positive for frequency, hematuria and urgency. Negative for flank pain. Historical Urgency, frequency, hematuria    Musculoskeletal: Negative. Skin: Negative for itching and rash. Neurological: Negative. Endo/Heme/Allergies: Negative. Psychiatric/Behavioral: Negative. Allergies   Allergen Reactions    Codeine Nausea and Vomiting    Macrobid [Nitrofurantoin Monohyd/M-Cryst] Nausea and Vomiting       Current Outpatient Prescriptions   Medication Sig Dispense Refill    levoFLOXacin (LEVAQUIN) 500 mg tablet Take 1 Tab by mouth daily for 7 days. Indications: BACTERIAL URINARY TRACT INFECTION 7 Tab 0    fluconazole (DIFLUCAN) 150 mg tablet Take 1 Tab by mouth daily for 1 day.  FDA advises cautious prescribing of oral fluconazole in pregnancy. Indications: PREVENTION OF VULVOVAGINAL CANDIDIASIS, VULVOVAGINAL CANDIDIASIS 1 Tab 0    azithromycin (ZITHROMAX) 500 mg tab Take 2 Tabs by mouth daily for 1 dose. Indications: ACUTE GONOCOCCAL CERVICITIS, Chlamydia Cervicitis 2 Tab 0    omeprazole (PRILOSEC) 20 mg capsule TAKE 1 CAPSULE BY MOUTH DAILY 30 Cap 0    ALPRAZolam (XANAX) 0.25 mg tablet Take 1 Tab by mouth three (3) times daily as needed for Anxiety. Max Daily Amount: 0.75 mg. 30 Tab 0    venlafaxine-SR (EFFEXOR-XR) 75 mg capsule Take 3 Caps by mouth daily. 90 Cap 2    OLANZapine (ZYPREXA) 5 mg tablet Take 1 Tab by mouth nightly. 30 Tab 3    lisinopril (PRINIVIL, ZESTRIL) 2.5 mg tablet TAKE ONE TABLET BY MOUTH EVERY DAY 90 Tab 3    chromium-herb no.238 250-775 mcg-mg tab Take  by mouth.  red yeast rice extract 600 mg cap Take 600 mg by mouth now.  ciclopirox (PENLAC) 8 % solution Apply to toenail every bedtime 6.6 mL 2       Social History     Social History    Marital status:      Spouse name: N/A    Number of children: N/A    Years of education: N/A     Occupational History    Not on file. Social History Main Topics    Smoking status: Never Smoker    Smokeless tobacco: Never Used    Alcohol use 14.0 oz/week     28 Glasses of wine per week      Comment: occasionally 3-4 glasses wine per night    Drug use: No    Sexual activity: Not on file     Other Topics Concern    Not on file     Social History Narrative       Past Medical History:   Diagnosis Date    Alcohol abuse     Bladder cancer ([facility] [address] 04/2011    Clinical stage Ta, well differentiated urothelial carcinoma of the bladder s/p TURBT.  Dr. Jasper Dang Depression     FH: breast cancer     Hematuria, microscopic     Hypercholesteremia     Hypertension     Osteopenia     Vitamin D deficiency        Past Surgical History:   Procedure Laterality Date    CYSTOSCOPY  4/27/12    ENDOSCOPY, COLON, DIAGNOSTIC      HX UROLOGICAL Family History   Problem Relation Age of Onset    Cancer Mother      breast    Stroke Mother     Cancer Father      lung    Cancer Sister        Objective:  Vitals:    07/24/17 1452   BP: 118/78   Pulse: 82   Resp: 14   Temp: 98.5 °F (36.9 °C)   TempSrc: Oral   SpO2: 98%   Weight: 157 lb 9.6 oz (71.5 kg)   Height: 5' 6\" (1.676 m)   PainSc:   0 - No pain       LABS   Results for orders placed or performed in visit on 07/24/17   AMB POC URINALYSIS DIP STICK AUTO W/ MICRO   Result Value Ref Range    Color (UA POC) Yellow     Clarity (UA POC) Clear     Glucose (UA POC) Negative Negative    Bilirubin (UA POC) 1+ Negative    Ketones (UA POC) Trace Negative    Specific gravity (UA POC) 1.030 1.001 - 1.035    Blood (UA POC) 1+ Negative    pH (UA POC) 5.5 4.6 - 8.0    Protein (UA POC) Trace Negative mg/dL    Urobilinogen (UA POC) 2 mg/dL 0.2 - 1    Nitrites (UA POC) Negative Negative    Leukocyte esterase (UA POC) 3+ Negative       TESTS  None today    PE  Physical Exam   Constitutional: She is oriented to person, place, and time. She appears well-developed and well-nourished. Cardiovascular: Normal rate, regular rhythm and normal heart sounds. Pulmonary/Chest: Effort normal and breath sounds normal.   Abdominal: Soft. Bowel sounds are normal.   Genitourinary: Vagina normal. No vaginal discharge found. Genitourinary Comments: Vulva has normal appearance without rash, lesions, abrasions. There is a scant thin white milky discharge. The cervix and vagina are inflamed and with complaints of sharp knifelike pain with speculum insertion. Neurological: She is alert and oriented to person, place, and time. Skin: Skin is warm and dry. No rash noted. Psychiatric: She has a normal mood and affect. Assessment/Plan:    1. Vaginal discharge/unprotected intercourse- UA/STD panel/urine for Chlamydia and Gonorrhea today. Levaquin 7 day course. Azithromycin 1 g.  Fluconazole script provided and symptoms of yeast infection education provided. Stop use of douche products and use protection during intercourse. No sexual intercourse until antibiotics are complete. 2. Urinary tract infection- UA presents with 3+ leukocytes, patient symptomatic for discharge only, history of ongoing hematuria, frequency, and urgency. Levaquin 7 day course. Increase water intake. Lab review: orders written for new lab studies as appropriate; see orders    Today's Visit:   Alessia Blandon was seen today for vaginal discharge. Diagnoses and all orders for this visit:    Vaginal discharge  -     AMB POC URINALYSIS DIP STICK AUTO W/ MICRO  -     CHLAMYDIA/GC AMPLIFIED (Specify Source); Future  -     T PALLIDUM AB; Future  -     HEP B SURFACE AG; Future  -     HEPATITIS C AB; Future  -     HIV 1/2 AG/AB, 4TH GENERATION,W RFLX CONFIRM  -     CULTURE, URINE; Future  -     levoFLOXacin (LEVAQUIN) 500 mg tablet; Take 1 Tab by mouth daily for 7 days. Indications: BACTERIAL URINARY TRACT INFECTION  -     fluconazole (DIFLUCAN) 150 mg tablet; Take 1 Tab by mouth daily for 1 day. FDA advises cautious prescribing of oral fluconazole in pregnancy. Indications: PREVENTION OF VULVOVAGINAL CANDIDIASIS, VULVOVAGINAL CANDIDIASIS  -     CHLAMYDIA/GC AMPLIFIED (Specify Source)  -     T PALLIDUM AB  -     HEP B SURFACE AG  -     HEPATITIS C AB  -     CULTURE, URINE    Unprotected sexual intercourse  -     AMB POC URINALYSIS DIP STICK AUTO W/ MICRO  -     CHLAMYDIA/GC AMPLIFIED (Specify Source); Future  -     T PALLIDUM AB; Future  -     HEP B SURFACE AG; Future  -     HEPATITIS C AB; Future  -     HIV 1/2 AG/AB, 4TH GENERATION,W RFLX CONFIRM  -     CULTURE, URINE; Future  -     levoFLOXacin (LEVAQUIN) 500 mg tablet; Take 1 Tab by mouth daily for 7 days. Indications: BACTERIAL URINARY TRACT INFECTION  -     azithromycin (ZITHROMAX) 500 mg tab; Take 2 Tabs by mouth daily for 1 dose.  Indications: ACUTE GONOCOCCAL CERVICITIS, Chlamydia Cervicitis  -     CHLAMYDIA/GC AMPLIFIED (Specify Source)  -     T PALLIDUM AB  -     HEP B SURFACE AG  -     HEPATITIS C AB  -     CULTURE, URINE    Other orders  -     Cancel: trimethoprim-sulfamethoxazole (BACTRIM DS, SEPTRA DS) 160-800 mg per tablet; Take 1 Tab by mouth two (2) times a day for 3 days. Indications: BACTERIAL URINARY TRACT INFECTION        Health Maintenance: not addressed today    I have discussed the diagnosis with the patient and the intended plan as seen in the above orders. The patient has received an after-visit summary and questions were answered concerning future plans. I have discussed medication side effects and warnings with the patient as well. I have reviewed the plan of care with the patient, accepted their input and they are in agreement with the treatment goals. Follow-up Disposition:  Return if symptoms worsen or fail to improve. More than 1/2 of this 30 minute visit was spent in counseling and coordination of care, as described above.     AIDA ObrienC

## 2017-07-24 NOTE — PROGRESS NOTES
1. Have you been to the ER, urgent care clinic or hospitalized since your last visit? NO.     2. Have you seen or consulted any other health care providers outside of the [address] [place] [address] since your last visit (Include any pap smears or colon screening)? NO      Do you have an Advanced Directive? NO    Would you like information on Advanced Directives?  NO

## 2017-07-26 ENCOUNTER — TELEPHONE (OUTPATIENT)
Dept: INTERNAL MEDICINE CLINIC | Age: 62
End: 2017-07-26

## 2017-07-26 NOTE — TELEPHONE ENCOUNTER
Discussed recent lab results. She completed the Azithromycin and is taking the Levaquin as prescribed, she has 5 days left on treatment. She stated that the vaginal discharge is still present and thicker. She does have the fluconazole script that she is instructed to take after completing the antibiotics.

## 2017-08-07 ENCOUNTER — TELEPHONE (OUTPATIENT)
Dept: INTERNAL MEDICINE CLINIC | Age: 62
End: 2017-08-07

## 2017-08-07 NOTE — TELEPHONE ENCOUNTER
Patient saw Donato Alamo on 7/24 for yeast infection. She finished the medication and it has improved slightly but it has not cleared up completely. Please advise.

## 2017-08-08 RX ORDER — ALPRAZOLAM 0.25 MG/1
TABLET ORAL
Qty: 30 TAB | Refills: 0 | OUTPATIENT
Start: 2017-08-08 | End: 2018-01-21 | Stop reason: ALTCHOICE

## 2017-08-08 NOTE — TELEPHONE ENCOUNTER
Could you please call . Yvonne Maguire and have her follow up with her GYN for the ongoing vaginal discharge/irritation.   Thank you

## 2017-08-08 NOTE — TELEPHONE ENCOUNTER
Reviewed report generated by the [facility]. Does not demonstrate aberrancies or inconsistencies with regard to the prescribing of controlled medications to this patient by other providers. Last filled Xanax 6/21/2017 per .

## 2017-08-28 RX ORDER — OMEPRAZOLE 20 MG/1
CAPSULE, DELAYED RELEASE ORAL
Qty: 30 CAP | Refills: 5 | Status: SHIPPED | OUTPATIENT
Start: 2017-08-28 | End: 2018-01-18 | Stop reason: SDUPTHER

## 2017-08-28 RX ORDER — LISINOPRIL 2.5 MG/1
TABLET ORAL
Qty: 90 TAB | Refills: 1 | Status: SHIPPED | OUTPATIENT
Start: 2017-08-28 | End: 2018-05-02 | Stop reason: SDUPTHER

## 2017-09-08 ENCOUNTER — TELEPHONE (OUTPATIENT)
Dept: INTERNAL MEDICINE CLINIC | Age: 62
End: 2017-09-08

## 2017-09-08 RX ORDER — FLUCONAZOLE 150 MG/1
150 TABLET ORAL DAILY
Qty: 1 TAB | Refills: 0 | Status: SHIPPED | OUTPATIENT
Start: 2017-09-08 | End: 2017-09-09

## 2017-09-08 RX ORDER — NYSTATIN 100000 [USP'U]/G
POWDER TOPICAL 2 TIMES DAILY
Qty: 30 G | Refills: 2 | Status: SHIPPED | OUTPATIENT
Start: 2017-09-08 | End: 2018-07-18

## 2017-09-08 RX ORDER — CLOTRIMAZOLE AND BETAMETHASONE DIPROPIONATE 10; .64 MG/G; MG/G
CREAM TOPICAL
Qty: 45 G | Refills: 1 | Status: SHIPPED | OUTPATIENT
Start: 2017-09-08 | End: 2018-01-18 | Stop reason: ALTCHOICE

## 2017-09-08 NOTE — TELEPHONE ENCOUNTER
From the description, it sounds more like candidal intertrigo rather than vaginal yeast infection. Would do better with a topical antifungal/cortisone cream to apply to skin twice per day and use of an antifungal powder as drying agent after rash improves to prevent recurrence. Diflucan would be of benefit if she was having a vaginal discharge consistent with yeast infection (white, cheesy) with pruritus, but would not be best choice for this. Please clarify symptoms and will send over scripts to [place].

## 2017-09-08 NOTE — TELEPHONE ENCOUNTER
Spoke with patient, she said that she has another yeast infection, she said her skin is raw around her vagina and now spreading towards her inner thighs/groin. She took the Jamia Beagle gave her in July and it helped but now it is back. I asked if she has contacted her GYN and she said she doesn't have the $40 copay to see them and they wont send her anything into the pharmacy without seeing her. She is requesting another rx for diflucan if you agree?  I did however let her know that since this is a recurrent problem she needs to see GYN very soon to discuss this problem and we also talked about making sure she dries well after she showers, cleaning well after sexual contact, and general hygiene that can help prevent yeast infections, etc  [facility] on [address]

## 2017-09-08 NOTE — TELEPHONE ENCOUNTER
Spoke with patient, she said that she is having itching and white, thick vaginal discharge as well as the rawness and redness in her inner thighs and groin area. She is putting powder on her thighs to help. She is sleeping in cotton underwear as well, she also said there is an odor although its not terrible but noticeable.  She does notice some itching that started last night

## 2017-09-08 NOTE — TELEPHONE ENCOUNTER
Will order Diflucan to address vaginal yeast infection as well as topical Lotrisone and antifungal powder for skin involvement. She should not use baby powder as that can promote the yeast growth. Scripts sent to [facility]. Thanks.

## 2017-12-14 DIAGNOSIS — R73.09 ABNORMAL GLUCOSE: ICD-10-CM

## 2017-12-14 DIAGNOSIS — Z72.51 UNPROTECTED SEXUAL INTERCOURSE: ICD-10-CM

## 2017-12-14 DIAGNOSIS — I10 ESSENTIAL HYPERTENSION: ICD-10-CM

## 2018-01-09 PROBLEM — F33.9 RECURRENT DEPRESSION (HCC): Status: ACTIVE | Noted: 2018-01-09

## 2018-01-16 ENCOUNTER — TELEPHONE (OUTPATIENT)
Dept: INTERNAL MEDICINE CLINIC | Age: 63
End: 2018-01-16

## 2018-01-16 NOTE — TELEPHONE ENCOUNTER
Called and left a message on patient's home phone answering service to give us a call back regarding Dr. Yasemin Bowman' note.

## 2018-01-16 NOTE — TELEPHONE ENCOUNTER
Patient had swelling in both ankles. Was unable to walk. Painful, red and purple. She went to [facility] and was dx with an infection from a spider bite. She is taking an antibiotic. She is still having swelling in the right foot. Color is mostly back to normall. She wants to know if she needs to follow up in the office. She also said the Omeprazole is not working. She took Dexilant in the past but it was too expensive. Wants to know if there is something else she can try.

## 2018-01-16 NOTE — TELEPHONE ENCOUNTER
Reviewed Raj  ED evaluation notes. Reported some excoriations and skin irritation from scratching. Treated for cellulitis, but no mention of a spider bite. Please let her know that she should be seen if not improving. Regarding omeprazole, if having increasing symptoms, she could increase her dose to 40 mg (take two 20 mg tabs). Thanks.

## 2018-01-17 ENCOUNTER — TELEPHONE (OUTPATIENT)
Dept: INTERNAL MEDICINE CLINIC | Age: 63
End: 2018-01-17

## 2018-01-18 ENCOUNTER — OFFICE VISIT (OUTPATIENT)
Dept: INTERNAL MEDICINE CLINIC | Age: 63
End: 2018-01-18

## 2018-01-18 ENCOUNTER — TELEPHONE (OUTPATIENT)
Dept: INTERNAL MEDICINE CLINIC | Age: 63
End: 2018-01-18

## 2018-01-18 VITALS
TEMPERATURE: 98.4 F | HEART RATE: 101 BPM | SYSTOLIC BLOOD PRESSURE: 124 MMHG | OXYGEN SATURATION: 97 % | HEIGHT: 66 IN | RESPIRATION RATE: 18 BRPM | DIASTOLIC BLOOD PRESSURE: 84 MMHG | WEIGHT: 157 LBS | BODY MASS INDEX: 25.23 KG/M2

## 2018-01-18 DIAGNOSIS — Z72.51 UNPROTECTED SEXUAL INTERCOURSE: ICD-10-CM

## 2018-01-18 DIAGNOSIS — E78.5 DYSLIPIDEMIA: ICD-10-CM

## 2018-01-18 DIAGNOSIS — R31.29 MICROHEMATURIA: ICD-10-CM

## 2018-01-18 DIAGNOSIS — L03.115 CELLULITIS OF FOOT, RIGHT: Primary | ICD-10-CM

## 2018-01-18 DIAGNOSIS — F33.9 RECURRENT DEPRESSION (HCC): ICD-10-CM

## 2018-01-18 DIAGNOSIS — C67.9 MALIGNANT NEOPLASM OF URINARY BLADDER, UNSPECIFIED SITE (HCC): ICD-10-CM

## 2018-01-18 DIAGNOSIS — R73.09 ABNORMAL GLUCOSE: ICD-10-CM

## 2018-01-18 DIAGNOSIS — I10 ESSENTIAL HYPERTENSION: ICD-10-CM

## 2018-01-18 DIAGNOSIS — K21.9 GASTROESOPHAGEAL REFLUX DISEASE, ESOPHAGITIS PRESENCE NOT SPECIFIED: ICD-10-CM

## 2018-01-18 DIAGNOSIS — M85.89 OSTEOPENIA OF MULTIPLE SITES: ICD-10-CM

## 2018-01-18 DIAGNOSIS — Z23 ENCOUNTER FOR IMMUNIZATION: ICD-10-CM

## 2018-01-18 RX ORDER — DEXLANSOPRAZOLE 60 MG/1
60 CAPSULE, DELAYED RELEASE ORAL DAILY
Qty: 90 CAP | Refills: 3 | Status: CANCELLED | OUTPATIENT
Start: 2018-01-18

## 2018-01-18 RX ORDER — OMEPRAZOLE 40 MG/1
40 CAPSULE, DELAYED RELEASE ORAL DAILY
Qty: 30 CAP | Refills: 5 | Status: SHIPPED | OUTPATIENT
Start: 2018-01-18 | End: 2018-02-22 | Stop reason: ALTCHOICE

## 2018-01-18 NOTE — PROGRESS NOTES
Health Maintenance Due   Topic Date Due    Pneumococcal 19-64 Highest Risk (1 of 3 - PCV13) 10/18/1974    Influenza Age 5 to Adult  08/01/2017    BREAST CANCER SCRN MAMMOGRAM  03/15/2018     Patient given influenza vaccine, Flulaval, in left deltoid, per verbal order from Dr. Stephanie Dowell. Instructed patient to sit and wait 10-20 minutes before leaving the premises so that we can watch for any complications or adverse reactions. Patient given vaccine information statement handout before vaccine was given. Patient tolerated well without adverse reactions or complications. 1. Have you been to the ER, urgent care clinic or hospitalized since your last visit? NO. [id number] [facility] on [address]. 15, 2018 due to leg and feet swelling. 2. Have you seen or consulted any other health care providers outside of the [address] [place] [address] since your last visit (Include any pap smears or colon screening)? YES, Dr. Maggie Ralph last 6 days for Dermatology. Do you have an Advanced Directive? NO    Would you like information on Advanced Directives?  YES

## 2018-01-18 NOTE — PATIENT INSTRUCTIONS
Complete antibiotics. Apply moisturizer to legs. Cellulitis: Care Instructions  Your Care Instructions    Cellulitis is a skin infection. It often occurs after a break in the skin from a scrape, cut, bite, or puncture, or after a rash. The doctor has checked you carefully, but problems can develop later. If you notice any problems or new symptoms, get medical treatment right away. Follow-up care is a key part of your treatment and safety. Be sure to make and go to all appointments, and call your doctor if you are having problems. It's also a good idea to know your test results and keep a list of the medicines you take. How can you care for yourself at home? · Take your antibiotics as directed. Do not stop taking them just because you feel better. You need to take the full course of antibiotics. · Prop up the infected area on pillows to reduce pain and swelling. Try to keep the area above the level of your heart as often as you can. · If your doctor told you how to care for your wound, follow your doctor's instructions. If you did not get instructions, follow this general advice:  ¨ Wash the wound with clean water 2 times a day. Don't use hydrogen peroxide or alcohol, which can slow healing. ¨ You may cover the wound with a thin layer of petroleum jelly, such as Vaseline, and a nonstick bandage. ¨ Apply more petroleum jelly and replace the bandage as needed. · Be safe with medicines. Take pain medicines exactly as directed. ¨ If the doctor gave you a prescription medicine for pain, take it as prescribed. ¨ If you are not taking a prescription pain medicine, ask your doctor if you can take an over-the-counter medicine. To prevent cellulitis in the future  · Try to prevent cuts, scrapes, or other injuries to your skin. Cellulitis most often occurs where there is a break in the skin.   · If you get a scrape, cut, mild burn, or bite, wash the wound with clean water as soon as you can to help avoid infection. Don't use hydrogen peroxide or alcohol, which can slow healing. · If you have swelling in your legs (edema), support stockings and good skin care may help prevent leg sores and cellulitis. · Take care of your feet, especially if you have diabetes or other conditions that increase the risk of infection. Wear shoes and socks. Do not go barefoot. If you have athlete's foot or other skin problems on your feet, talk to your doctor about how to treat them. When should you call for help? Call your doctor now or seek immediate medical care if:  ? · You have signs that your infection is getting worse, such as:  ¨ Increased pain, swelling, warmth, or redness. ¨ Red streaks leading from the area. ¨ Pus draining from the area. ¨ A fever. ? · You get a rash. ? Watch closely for changes in your health, and be sure to contact your doctor if:  ? · You are not getting better after 1 day (24 hours). ? · You do not get better as expected. Where can you learn more? Go to http://kasey-yamilet.info/. Madelon Cabot in the search box to learn more about \"Cellulitis: Care Instructions. \"  Current as of: October 13, 2016  Content Version: 11.4  © 3647-0850 [employer], [employer]. Care instructions adapted under license by [employer] (which disclaims liability or warranty for this information). If you have questions about a medical condition or this instruction, always ask your healthcare professional. [place] [address] any warranty or liability for your use of this information.

## 2018-01-18 NOTE — MR AVS SNAPSHOT
[address] [facility] [address] 
 
 
 [address], [address] [place] [address] [facility] 
[phone] Patient: Blaise Morrell MRN: [id number] :1955 Visit Information Date & Time Provider Department Dept. Phone Encounter #  
 2018 11:00 AM Magdalena Buitrago MD [facility] [phone] Follow-up Instructions Return if symptoms worsen or fail to improve. Your Appointments 2/15/2018 10:35 AM  
LAB with [facility] NURSE VISIT [facility] ([address]) Appt Note: labs [address], Suite [facility] [address] [place]  
  
   
 [address], [address]  
  
    
 2018  9:00 AM  
PHYSICAL with Magdalena Buitrago MD  
[facility] [address] [facility]) Appt Note: CPE & Physical  
 [address] [facility] [address] [place]  
  
   
 [address], [address] Upcoming Health Maintenance Date Due Pneumococcal 19-64 Highest Risk (1 of 3 - PCV13) 10/18/1974 Influenza Age 5 to Adult 2017 BREAST CANCER SCRN MAMMOGRAM 3/15/2018 PAP AKA CERVICAL CYTOLOGY 2018 COLONOSCOPY 2019 DTaP/Tdap/Td series (2 - Td) 2027 Allergies as of 2018  Review Complete On: 2018 By: Aura Carter Severity Noted Reaction Type Reactions Codeine    Nausea and Vomiting Macrobid [Nitrofurantoin Monohyd/m-cryst]  2015    Nausea and Vomiting Current Immunizations  Reviewed on 2016 Name Date PPD 2011 TD Vaccine 2007 Td 2015 Tdap 2017 Zoster Vaccine, Live 2016  3:20 PM  
  
 Not reviewed this visit Vitals BP Pulse Temp Resp Height(growth percentile) Weight(growth percentile) 124/84 (BP 1 Location: Left arm, BP Patient Position: Sitting) (!) 101 98.4 °F (36.9 °C) (Oral) 18 5' 6\" (1.676 m) 157 lb (71.2 kg) SpO2 BMI OB Status Smoking Status 97% 25.34 kg/m2 Postmenopausal Never Smoker Vitals History BMI and BSA Data Body Mass Index Body Surface Area  
 25.34 kg/m 2 1.82 m 2 Preferred Pharmacy Pharmacy Name Phone [facility] [address] [phone] Your Updated Medication List  
  
   
This list is accurate as of: 1/18/18 12:09 PM.  Always use your most recent med list.  
  
  
  
  
 ALPRAZolam 0.25 mg tablet Commonly known as:  XANAX  
TAKE 1 TABLET BY MOUTH THREE TIMES DAILY AS NEEDED FOR ANXIETY  
  
 chromium-herb no.238 250-775 mcg-mg Tab Take  by mouth.  
  
 lisinopril 2.5 mg tablet Commonly known as:  PRINIVIL, ZESTRIL  
TAKE ONE TABLET BY MOUTH EVERY DAY  
  
 nystatin powder Commonly known as:  MYCOSTATIN Apply  to affected area two (2) times a day. To use after redness resolves to prevent recurrence. OLANZapine 5 mg tablet Commonly known as:  ZyPREXA Take 1 Tab by mouth nightly. omeprazole 40 mg capsule Commonly known as:  PRILOSEC Take 1 Cap by mouth daily. red yeast rice extract 600 mg Cap Take 600 mg by mouth now. venlafaxine-SR 75 mg capsule Commonly known as:  EFFEXOR-XR Take 3 Caps by mouth daily. Prescriptions Sent to Pharmacy Refills  
 omeprazole (PRILOSEC) 40 mg capsule 5 Sig: Take 1 Cap by mouth daily. Class: Normal  
 Pharmacy: [facility] [address] [place] [address], [address] [place] [address] #: [phone] Route: Oral  
  
Follow-up Instructions Return if symptoms worsen or fail to improve. Patient Instructions Complete antibiotics. Apply moisturizer to legs. Cellulitis: Care Instructions Your Care Instructions Cellulitis is a skin infection. It often occurs after a break in the skin from a scrape, cut, bite, or puncture, or after a rash. The doctor has checked you carefully, but problems can develop later. If you notice any problems or new symptoms, get medical treatment right away. Follow-up care is a key part of your treatment and safety. Be sure to make and go to all appointments, and call your doctor if you are having problems. It's also a good idea to know your test results and keep a list of the medicines you take. How can you care for yourself at home? · Take your antibiotics as directed. Do not stop taking them just because you feel better. You need to take the full course of antibiotics. · Prop up the infected area on pillows to reduce pain and swelling. Try to keep the area above the level of your heart as often as you can. · If your doctor told you how to care for your wound, follow your doctor's instructions. If you did not get instructions, follow this general advice: ¨ Wash the wound with clean water 2 times a day. Don't use hydrogen peroxide or alcohol, which can slow healing. ¨ You may cover the wound with a thin layer of petroleum jelly, such as Vaseline, and a nonstick bandage. ¨ Apply more petroleum jelly and replace the bandage as needed. · Be safe with medicines. Take pain medicines exactly as directed. ¨ If the doctor gave you a prescription medicine for pain, take it as prescribed. ¨ If you are not taking a prescription pain medicine, ask your doctor if you can take an over-the-counter medicine. To prevent cellulitis in the future · Try to prevent cuts, scrapes, or other injuries to your skin. Cellulitis most often occurs where there is a break in the skin. · If you get a scrape, cut, mild burn, or bite, wash the wound with clean water as soon as you can to help avoid infection. Don't use hydrogen peroxide or alcohol, which can slow healing. · If you have swelling in your legs (edema), support stockings and good skin care may help prevent leg sores and cellulitis. · Take care of your feet, especially if you have diabetes or other conditions that increase the risk of infection. Wear shoes and socks. Do not go barefoot. If you have athlete's foot or other skin problems on your feet, talk to your doctor about how to treat them. When should you call for help? Call your doctor now or seek immediate medical care if: 
? · You have signs that your infection is getting worse, such as: 
¨ Increased pain, swelling, warmth, or redness. ¨ Red streaks leading from the area. ¨ Pus draining from the area. ¨ A fever. ? · You get a rash. ? Watch closely for changes in your health, and be sure to contact your doctor if: 
? · You are not getting better after 1 day (24 hours). ? · You do not get better as expected. Where can you learn more? Go to http://kasey[employer] Power Electronicsyamilet.info/. Madelon Cabot in the search box to learn more about \"Cellulitis: Care Instructions. \" Current as of: October 13, 2016 Content Version: 11.4 © 9444-9780 [employer]. Care instructions adapted under license by [employer] (which disclaims liability or warranty for this information). If you have questions about a medical condition or this instruction, always ask your healthcare professional. [address] any warranty or liability for your use of this information. Introducing [facility]! Virgilio López introduces Hello World Mobile patient portal. Now you can access parts of your medical record, email your doctor's office, and request medication refills online. 1. In your internet browser, go to [email or web address] 2. Click on the First Time User? Click Here link in the Sign In box. You will see the New Member Sign Up page. 3. Enter your Hello World Mobile Access Code exactly as it appears below. You will not need to use this code after youve completed the sign-up process.  If you do not sign up before the expiration date, you must request a new code. · [employer] Access Code: THE [facility] Expires: 4/18/2018 10:46 AM 
 
4. Enter the last four digits of your Social Security Number (xxxx) and Date of Birth (mm/dd/yyyy) as indicated and click Submit. You will be taken to the next sign-up page. 5. Create a The [employer] ID. This will be your [employer] login ID and cannot be changed, so think of one that is secure and easy to remember. 6. Create a [employer] password. You can change your password at any time. 7. Enter your Password Reset Question and Answer. This can be used at a later time if you forget your password. 8. Enter your e-mail address. You will receive e-mail notification when new information is available in [address] 9. Click Sign Up. You can now view and download portions of your medical record. 10. Click the Download Summary menu link to download a portable copy of your medical information. If you have questions, please visit the Frequently Asked Questions section of the [employer] website. Remember, [employer] is NOT to be used for urgent needs. For medical emergencies, dial 911. Now available from your iPhone and Android! Please provide this summary of care documentation to your next provider. Your primary care clinician is listed as Zeeshan Robin. If you have any questions after today's visit, please call [phone].

## 2018-01-18 NOTE — TELEPHONE ENCOUNTER
Patient has an upcoming physical next month. She wants to know if an EKG will be done because she would like to have one. I told her those are usually only done if there is something wrong and the physician feel it is needed. She still asks that it be done at that appt.
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
## 2018-01-22 NOTE — TELEPHONE ENCOUNTER
Pt calling again wanting to speak to a nurse something about MdDS syndrome. Says the recording said to hang up and go to the ER she not going there because the co pay is too big. Says she spent 11 hours in [facility].     Pt also stated she doesn't remember going out to eat with her neighbors or driving home last night or what time she got home last night. Pt. Says she couldn't find her meds and has had a migraines for a couple days.      Pt would like a call back

## 2018-01-22 NOTE — TELEPHONE ENCOUNTER
Pt calling to speak with a nurse- It was really hard to understand her and was confused with her question    Please call

## 2018-01-25 NOTE — TELEPHONE ENCOUNTER
LAURA again, . Will done this message and see if she calls back.  I did leave a long message with her and ask she call back ASAP

## 2018-01-31 ENCOUNTER — TELEPHONE (OUTPATIENT)
Dept: INTERNAL MEDICINE CLINIC | Age: 63
End: 2018-01-31
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
## 2018-01-31 NOTE — TELEPHONE ENCOUNTER
Per note from Dr. Saskia De León patient and gave her times and dates she is on [facility] Schedule. She is scheduled to see Dr. Virginia Medellin 2/22/2018.  Wants to know she can see Dr. Virginia Medellin sooner or if she should see Quinton Velasco for a follow up from hospital?

## 2018-01-31 NOTE — TELEPHONE ENCOUNTER
Looks like 10:30 or 12:30 tomorrow must have had cancellations. Can she come in for either of these times?
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
## 2018-02-01 ENCOUNTER — TELEPHONE (OUTPATIENT)
Dept: INTERNAL MEDICINE CLINIC | Age: 63
End: 2018-02-01

## 2018-02-01 ENCOUNTER — PATIENT OUTREACH (OUTPATIENT)
Dept: INTERNAL MEDICINE CLINIC | Age: 63
End: 2018-02-01
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
## 2018-02-02 ENCOUNTER — TELEPHONE (OUTPATIENT)
Dept: INTERNAL MEDICINE CLINIC | Age: 63
End: 2018-02-02

## 2018-02-02 ENCOUNTER — PATIENT OUTREACH (OUTPATIENT)
Dept: INTERNAL MEDICINE CLINIC | Age: 63
End: 2018-02-02

## 2018-02-02 NOTE — PROGRESS NOTES
Nurse Navigator second attempt to contact patient post discharge from [facility]; 1/23/2018 to 1/28/2018. Unable to leave a message mailbox is full, letter sent.

## 2018-02-03 NOTE — TELEPHONE ENCOUNTER
Received call from patient. Reports that she has not had a bowel movement since she was discharged from the hospital on 1/28/2018. She states that \"my stomach looks funny\", but denies any abdominal pain, nausea, vomiting, or urge to defecate. Patient confirms that she is taking oxycodone for pain from her recent anterior cervical discectomy and fusion as prescribed by neurosurgery. Recommended that patient take MiraLax +/- dulcolax for constipation. Explained that general anesthesia and narcotics will often cause or exacerbate constipation. Advised that if she should develop pain or localizing signs, nausea, or vomiting, she should call back or present to ED. Patient stated she will obtain above from local pharmacy.

## 2018-02-12 ENCOUNTER — TELEPHONE (OUTPATIENT)
Dept: INTERNAL MEDICINE CLINIC | Age: 63
End: 2018-02-12

## 2018-02-12 NOTE — TELEPHONE ENCOUNTER
Spoke with pt. Pt reports bilat ankle swelling/edema, which has happened in the past. Denies any redness/discoloration, warmth, tenderness, fevers, or other constitutional sxs. No cardiopulmonary sxs. As of now, pt will keep lab appt on 2/15 then physical on 2/22. I will discuss with Dr. Sheryle Alliance when available. Pt will call if any developments in the meantime but will restrict sodium and elevate.

## 2018-02-12 NOTE — TELEPHONE ENCOUNTER
Patient is calling asking to speak to you. Stating she spoke to you this morning at 8:30 about her swollen feet and said you were going to page Dr. Andria Espino. She wouldn't give me anymore information. She wants to make she she hears something before the end of the day.

## 2018-02-13 ENCOUNTER — TELEPHONE (OUTPATIENT)
Dept: INTERNAL MEDICINE CLINIC | Age: 63
End: 2018-02-13

## 2018-02-13 NOTE — TELEPHONE ENCOUNTER
Mrs. Shreya Wahl called the on call phone last night around 2300 with concerns of shortness of breath. She was able to talk in full sentences, denies chest pain, heart palpitations. She was alert and oriented x 3, redirectable, no SI/HI. She has a history of psychological disorder and seemed to be in a manic state. She was instructed to hang up and call 911 if having shortness of breath.

## 2018-02-14 ENCOUNTER — TELEPHONE (OUTPATIENT)
Dept: INTERNAL MEDICINE CLINIC | Age: 63
End: 2018-02-14

## 2018-02-15 ENCOUNTER — TELEPHONE (OUTPATIENT)
Dept: INTERNAL MEDICINE CLINIC | Age: 63
End: 2018-02-15

## 2018-02-15 DIAGNOSIS — M85.89 OSTEOPENIA OF MULTIPLE SITES: ICD-10-CM

## 2018-02-15 DIAGNOSIS — I10 ESSENTIAL HYPERTENSION: ICD-10-CM

## 2018-02-15 DIAGNOSIS — C67.9 MALIGNANT NEOPLASM OF URINARY BLADDER, UNSPECIFIED SITE (HCC): ICD-10-CM

## 2018-02-15 DIAGNOSIS — R31.29 MICROHEMATURIA: ICD-10-CM

## 2018-02-15 DIAGNOSIS — E78.5 DYSLIPIDEMIA: ICD-10-CM

## 2018-02-15 DIAGNOSIS — R73.09 ABNORMAL GLUCOSE: ICD-10-CM

## 2018-02-15 DIAGNOSIS — Z72.51 UNPROTECTED SEXUAL INTERCOURSE: ICD-10-CM

## 2018-02-16 ENCOUNTER — TELEPHONE (OUTPATIENT)
Dept: INTERNAL MEDICINE CLINIC | Age: 63
End: 2018-02-16

## 2018-02-16 LAB
25(OH)D3 SERPL-MCNC: 31.4 NG/ML (ref 32–100)
A-G RATIO,AGRAT: 1.4 RATIO (ref 1.1–2.6)
ABSOLUTE LYMPHOCYTE COUNT, 10803: 1.3 K/UL (ref 1–4.8)
ALBUMIN SERPL-MCNC: 4 G/DL (ref 3.5–5)
ALP SERPL-CCNC: 101 U/L (ref 40–120)
ALT SERPL-CCNC: 19 U/L (ref 5–40)
ANION GAP SERPL CALC-SCNC: 15 MMOL/L
AST SERPL W P-5'-P-CCNC: 19 U/L (ref 10–37)
AVG GLU, 10930: 119 MG/DL (ref 91–123)
BASOPHILS # BLD: 0 K/UL (ref 0–0.2)
BASOPHILS NFR BLD: 1 % (ref 0–2)
BILIRUB SERPL-MCNC: 0.2 MG/DL (ref 0.2–1.2)
BUN SERPL-MCNC: 10 MG/DL (ref 6–22)
CALCIUM SERPL-MCNC: 9.5 MG/DL (ref 8.4–10.5)
CHLORIDE SERPL-SCNC: 104 MMOL/L (ref 98–110)
CHOLEST SERPL-MCNC: 214 MG/DL (ref 110–200)
CO2 SERPL-SCNC: 25 MMOL/L (ref 20–32)
CREAT SERPL-MCNC: 0.7 MG/DL (ref 0.8–1.4)
EOSINOPHIL # BLD: 0.2 K/UL (ref 0–0.5)
EOSINOPHIL NFR BLD: 3 % (ref 0–6)
ERYTHROCYTE [DISTWIDTH] IN BLOOD BY AUTOMATED COUNT: 16.7 % (ref 10–16)
GFRAA, 66117: >60
GFRNA, 66118: >60
GLOBULIN,GLOB: 2.9 G/DL (ref 2–4)
GLUCOSE SERPL-MCNC: 178 MG/DL (ref 70–99)
GRANULOCYTES,GRANS: 72 % (ref 40–75)
HBA1C MFR BLD HPLC: 5.8 % (ref 4.8–5.9)
HCT VFR BLD AUTO: 35.7 % (ref 35.1–48)
HDLC SERPL-MCNC: 2.2 MG/DL (ref 0–5)
HDLC SERPL-MCNC: 98 MG/DL (ref 40–59)
HGB BLD-MCNC: 10.9 G/DL (ref 11.7–16)
HIV -1/0/2 AG/AB WITH REFLEX, 13463: NON REACTIVE
HIV 1 & 2 AB SER-IMP: NORMAL
LDLC SERPL CALC-MCNC: 100 MG/DL (ref 50–99)
LYMPHOCYTES, LYMLT: 17 % (ref 27–45)
MCH RBC QN AUTO: 27 PG (ref 26–34)
MCHC RBC AUTO-ENTMCNC: 31 G/DL (ref 32–36)
MCV RBC AUTO: 89 FL (ref 80–95)
MONOCYTES # BLD: 0.5 K/UL (ref 0.1–0.9)
MONOCYTES NFR BLD: 7 % (ref 3–9)
NEUTROPHILS # BLD AUTO: 5.6 K/UL (ref 1.8–7.7)
PLATELET # BLD AUTO: 469 K/UL (ref 140–440)
PMV BLD AUTO: 10.7 FL (ref 6–10.8)
POTASSIUM SERPL-SCNC: 4.1 MMOL/L (ref 3.5–5.5)
PROT SERPL-MCNC: 6.9 G/DL (ref 6.2–8.1)
RBC # BLD AUTO: 4.03 M/UL (ref 3.8–5.2)
SODIUM SERPL-SCNC: 144 MMOL/L (ref 133–145)
TRIGL SERPL-MCNC: 81 MG/DL (ref 40–149)
VLDLC SERPL CALC-MCNC: 16 MG/DL (ref 8–30)
WBC # BLD AUTO: 7.7 K/UL (ref 4–11)

## 2018-02-16 NOTE — TELEPHONE ENCOUNTER
She cannot talk to Mav[place]. She has a history of obsessive behavior towards [place] and we can no longer allow this patient to speak to him/see him.  If she asks for him, please redirect her messages to her PCP/another provider or clinical staff
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
## 2018-02-16 NOTE — TELEPHONE ENCOUNTER
Spoke with patient at length. She discussed her visit with Dr. Puja Lay and stated that he told her she was manic. She stated that he prescribed a medication, but she refused to take it since one of the side effects was that it \"could make my hair fall out\". She stated that she called him and asked that she be prescribed a different medication, but he refused. She stated that she has started drinking again, but denied taking any more of the oxycodone prescribed after her neck surgery. Advised the patient that she did have bipolar disorder and that she was indeed in an acute manic episode. Discussed that Depakote would help her quickly with the episode. Told her that hair loss was an uncommon side effect of Depakote, and that she should take the medication. Discussed that it would help her quickly and if she did have side effects, it could then be changed. She stated that she was going to take Moncia Stu to her pharmacy and would talk to the pharmacist about the medication. She would then decide whether or not to take it. Also advised that she should wean Effexor as outlined by Dr. Puja Lay (75 mg for 4 weeks, then 37.5 mg for 4 weeks). Informed patient of her appointment with me next week. Provided date and time which she wrote down. Instructed to call back or go to ED if symptoms worsened before then.

## 2018-02-16 NOTE — TELEPHONE ENCOUNTER
Spoke with Rosalva (Dr Samy Rodrigez nurse), The patient did show to her appt this morning at 9am. She said the pt is still manic and was acting inappropriate to Dr Zen Blum and making sexual comments to him and telling him he is cute/sexy, to follow her home, etc. Dr Zen Blum explained to the patient is in a manic episode and wanted to start Depakote 500mg BID. He felt this would help her tremendously and fast, he even felt it would help her by tonight. The patient went to the pharmacy to get it and said she didn't like the \"possible side effects\" and refused to pick it up. When the pt called Dr Zen Blum to tell him, he explained again to her how important it was for her to take this and the patient began to act irate and told Dr Zen Blum he was a \"shitty doctor\". Dr Zen Blum at this point discharged the patient for non compliance. Phoenix Bashir said the patient would need to come off of her Effexor per Dr Zen Blum advice. He recommends she take 75 mg for 1 month then 37.5mg for 1 month then stop. She said its for depression and the pt is bipolar and its probably not helping her much. Dr Zen Blum recommended that we also tell the patient since we suggest she see psych and has been non compliant, we can threaten to discharge her as well and see if it would encourage her to try the depakote. He did not cancel the prescription at the pharmacy.

## 2018-02-16 NOTE — TELEPHONE ENCOUNTER
Patient went to the psychiatrist and she had a very bad experience. Wants to speak to Dr. Dejah Membreno.

## 2018-02-16 NOTE — TELEPHONE ENCOUNTER
I will call Dr Milton Fong office (psych) and see how her appointment went (she was supposed to go this morning at 9am)

## 2018-02-17 LAB — TSH SERPL DL<=0.005 MIU/L-ACNC: 0.59 MCU/ML (ref 0.27–4.2)

## 2018-02-17 NOTE — TELEPHONE ENCOUNTER
Incoming page from pt #[phone]. Pt displaying signs of sharyn/agitated state. She expressed frustrations with Dr. Puja Lay (psych), Dr. Khoa Sotomayor,  (ex?), and other issues. Pt refusing to start depakote at this time due to listed adverse effects. Discussed current state and medication at length. No violent ideation or signs of low mood were exhibited/reported/implied during our conversation. Pt reports foot swelling. Difficult to get details but no concerning sxs or signs of thrombosis per description. However, she agrees to visit urgent care or ED if worsening or concerning developments over the weekend as discussed. She will restrict salt and elevate for now. Pt reports developing a lesion on tongue c/w inflamed papilla. Discussed OTC tx, course, and prognosis. Observation and urgent can take a look if she visits for above. Recommended pt continue care with psych and to keep her upcoming appt with Dr. Khoa Sotomayor on 2/22. I was able to calm her considerably during our lengthy conversation.

## 2018-02-19 ENCOUNTER — TELEPHONE (OUTPATIENT)
Dept: INTERNAL MEDICINE CLINIC | Age: 63
End: 2018-02-19

## 2018-02-19 NOTE — TELEPHONE ENCOUNTER
Please see if she can wait for her visit on Thursday as I have no other openings. Her leg swelling was very minimal on Thursday last week, and her labs were fine. If she is worried about a UTI, she could always bring in a sample for culture or wait until Thursday and we will check one then. Thanks.

## 2018-02-19 NOTE — TELEPHONE ENCOUNTER
She has an appt Thursday with Dr Dimitrios Amezquita, when she was here for her labs it was swollen but not severely swollen, she had a good pedal pulse and her foot was warm, it was approx +1 pitting edema. Did you want to see her sooner?  I spoke with Marilyn Spence over the weekend as well and he told her to go to the ER if she had any severe pain, SOB, etc

## 2018-02-19 NOTE — TELEPHONE ENCOUNTER
Pt called again- thinks she has a uti- called Gary over swollen foot this weekend- is concerned about her foot- she is keeping it elevated

## 2018-02-20 ENCOUNTER — TELEPHONE (OUTPATIENT)
Dept: INTERNAL MEDICINE CLINIC | Age: 63
End: 2018-02-20

## 2018-02-20 NOTE — TELEPHONE ENCOUNTER
Pt calling to let Dr. Merlene Villegas know she has an awful smell coming from her vagina and a white cottage cheese substance coming out. Wants  to be aware in case she needs to do a pap while she is here. Advised her I would let  know and she can discuss at her appt on Thursday.

## 2018-02-22 ENCOUNTER — TELEPHONE (OUTPATIENT)
Dept: INTERNAL MEDICINE CLINIC | Age: 63
End: 2018-02-22

## 2018-02-22 ENCOUNTER — OFFICE VISIT (OUTPATIENT)
Dept: INTERNAL MEDICINE CLINIC | Age: 63
End: 2018-02-22

## 2018-02-22 VITALS
HEART RATE: 96 BPM | BODY MASS INDEX: 24.27 KG/M2 | SYSTOLIC BLOOD PRESSURE: 134 MMHG | TEMPERATURE: 99.5 F | OXYGEN SATURATION: 98 % | WEIGHT: 151 LBS | RESPIRATION RATE: 20 BRPM | DIASTOLIC BLOOD PRESSURE: 70 MMHG | HEIGHT: 66 IN

## 2018-02-22 DIAGNOSIS — K21.9 GASTROESOPHAGEAL REFLUX DISEASE, ESOPHAGITIS PRESENCE NOT SPECIFIED: Primary | ICD-10-CM

## 2018-02-22 DIAGNOSIS — Z00.01 ENCOUNTER FOR ROUTINE ADULT PHYSICAL EXAM WITH ABNORMAL FINDINGS: Primary | ICD-10-CM

## 2018-02-22 DIAGNOSIS — E78.5 DYSLIPIDEMIA: ICD-10-CM

## 2018-02-22 DIAGNOSIS — N94.9 ADNEXAL CYST: ICD-10-CM

## 2018-02-22 DIAGNOSIS — K21.9 GASTROESOPHAGEAL REFLUX DISEASE, ESOPHAGITIS PRESENCE NOT SPECIFIED: ICD-10-CM

## 2018-02-22 DIAGNOSIS — A64 SEXUALLY TRANSMITTED INFECTION: Primary | ICD-10-CM

## 2018-02-22 DIAGNOSIS — C67.9 MALIGNANT NEOPLASM OF URINARY BLADDER, UNSPECIFIED SITE (HCC): ICD-10-CM

## 2018-02-22 DIAGNOSIS — I10 ESSENTIAL HYPERTENSION: ICD-10-CM

## 2018-02-22 DIAGNOSIS — N89.8 VAGINAL DISCHARGE: ICD-10-CM

## 2018-02-22 DIAGNOSIS — M85.89 OSTEOPENIA OF MULTIPLE SITES: ICD-10-CM

## 2018-02-22 DIAGNOSIS — S12.400S: ICD-10-CM

## 2018-02-22 DIAGNOSIS — R35.0 URINARY FREQUENCY: ICD-10-CM

## 2018-02-22 DIAGNOSIS — F31.12 BIPOLAR AFFECTIVE DISORDER, CURRENTLY MANIC, MODERATE (HCC): ICD-10-CM

## 2018-02-22 DIAGNOSIS — S12.300S: ICD-10-CM

## 2018-02-22 DIAGNOSIS — R73.09 ABNORMAL GLUCOSE: ICD-10-CM

## 2018-02-22 DIAGNOSIS — Z72.51 UNPROTECTED SEXUAL INTERCOURSE: ICD-10-CM

## 2018-02-22 DIAGNOSIS — E55.9 VITAMIN D INSUFFICIENCY: ICD-10-CM

## 2018-02-22 LAB
BILIRUB UR QL: NEGATIVE
EPITHELIAL,EPSU: ABNORMAL /HPF (ref 0–2)
GLUCOSE UR QL: NEGATIVE MG/DL
HGB UR QL STRIP: ABNORMAL
KETONES UR QL STRIP.AUTO: NEGATIVE MG/DL
LEUKOCYTE ESTERASE: ABNORMAL
NITRITE UR QL STRIP.AUTO: NEGATIVE
PH UR STRIP: 5 PH (ref 5–8)
PROT UR QL STRIP: NEGATIVE MG/DL
RBC #/AREA URNS HPF: ABNORMAL /HPF
SP GR UR: 1.01 (ref 1–1.03)
UROBILINOGEN UR STRIP-MCNC: <2 MG/DL
WBC URNS QL MICRO: ABNORMAL /HPF (ref 0–2)

## 2018-02-22 RX ORDER — OMEPRAZOLE 40 MG/1
40 CAPSULE, DELAYED RELEASE ORAL DAILY
Qty: 30 CAP | Refills: 5
Start: 2018-02-22 | End: 2018-03-12 | Stop reason: ALTCHOICE

## 2018-02-22 NOTE — TELEPHONE ENCOUNTER
Pt called from the car. Said she needs to know rate of compression, like 0-10, 10-20, etc for the compression hose she is driving to get. I went and asked Dr Yasemin Bowman and she wrote script for light compression and diagnosis, that should be sufficient. Gave that info to pt and she said they told her they need a number. She is going to [place] to pick them up so if there is any problem, that office is going to call us. Pt said she is not coming back to our office today for anything because she \"spent all morning here. \"

## 2018-02-22 NOTE — PATIENT INSTRUCTIONS
Eating Healthy Foods: Care Instructions  Your Care Instructions    Eating healthy foods can help lower your risk for disease. Healthy food gives you energy and keeps your heart strong, your brain active, your muscles working, and your bones strong. A healthy diet includes a variety of foods from the basic food groups: grains, vegetables, fruits, milk and milk products, and meat and beans. Some people may eat more of their favorite foods from only one food group and, as a result, miss getting the nutrients they need. So, it is important to pay attention not only to what you eat but also to what you are missing from your diet. You can eat a healthy, balanced diet by making a few small changes. Follow-up care is a key part of your treatment and safety. Be sure to make and go to all appointments, and call your doctor if you are having problems. It's also a good idea to know your test results and keep a list of the medicines you take. How can you care for yourself at home? Look at what you eat  · Keep a food diary for a week or two and record everything you eat or drink. Track the number of servings you eat from each food group. · For a balanced diet every day, eat a variety of:  ¨ 6 or more ounce-equivalents of grains, such as cereals, breads, crackers, rice, or pasta, every day. An ounce-equivalent is 1 slice of bread, 1 cup of ready-to-eat cereal, or ½ cup of cooked rice, cooked pasta, or cooked cereal.  ¨ 2½ cups of vegetables, especially:  § Dark-green vegetables such as broccoli and spinach. § Orange vegetables such as carrots and sweet potatoes. § Dry beans (such as segundo and kidney beans) and peas (such as lentils). ¨ 2 cups of fresh, frozen, or canned fruit. A small apple or 1 banana or orange equals 1 cup. ¨ 3 cups of nonfat or low-fat milk, yogurt, or other milk products. ¨ 5½ ounces of meat and beans, such as chicken, fish, lean meat, beans, nuts, and seeds.  One egg, 1 tablespoon of peanut butter, ½ ounce nuts or seeds, or ¼ cup of cooked beans equals 1 ounce of meat. · Learn how to read food labels for serving sizes and ingredients. Fast-food and convenience-food meals often contain few or no fruits or vegetables. Make sure you eat some fruits and vegetables to make the meal more nutritious. · Look at your food diary. For each food group, add up what you have eaten and then divide the total by the number of days. This will give you an idea of how much you are eating from each food group. See if you can find some ways to change your diet to make it more healthy. Start small  · Do not try to make dramatic changes to your diet all at once. You might feel that you are missing out on your favorite foods and then be more likely to fail. · Start slowly, and gradually change your habits. Try some of the following:  ¨ Use whole wheat bread instead of white bread. ¨ Use nonfat or low-fat milk instead of whole milk. ¨ Eat brown rice instead of white rice, and eat whole wheat pasta instead of white-flour pasta. ¨ Try low-fat cheeses and low-fat yogurt. ¨ Add more fruits and vegetables to meals and have them for snacks. ¨ Add lettuce, tomato, cucumber, and onion to sandwiches. ¨ Add fruit to yogurt and cereal.  Enjoy food  · You can still eat your favorite foods. You just may need to eat less of them. If your favorite foods are high in fat, salt, and sugar, limit how often you eat them, but do not cut them out entirely. · Eat a wide variety of foods. Make healthy choices when eating out  · The type of restaurant you choose can help you make healthy choices. Even fast-food chains are now offering more low-fat or healthier choices on the menu. · Choose smaller portions, or take half of your meal home. · When eating out, try:  ¨ A veggie pizza with a whole wheat crust or grilled chicken (instead of sausage or pepperoni).   ¨ Pasta with roasted vegetables, grilled chicken, or marinara sauce instead of cream sauce. ¨ A vegetable wrap or grilled chicken wrap. ¨ Broiled or poached food instead of fried or breaded items. Make healthy choices easy  · Buy packaged, prewashed, ready-to-eat fresh vegetables and fruits, such as baby carrots, salad mixes, and chopped or shredded broccoli and cauliflower. · Buy packaged, presliced fruits, such as melon or pineapple. · Choose 100% fruit or vegetable juice instead of soda. Limit juice intake to 4 to 6 oz (½ to ¾ cup) a day. · Blend low-fat yogurt, fruit juice, and canned or frozen fruit to make a smoothie for breakfast or a snack. Where can you learn more? Go to http://kaseyOverseeyamilet.info/. Enter T756 in the search box to learn more about \"Eating Healthy Foods: Care Instructions. \"  Current as of: May 12, 2017  Content Version: 11.4  © 0802-9338 [employer]. Care instructions adapted under license by [employer] (which disclaims liability or warranty for this information). If you have questions about a medical condition or this instruction, always ask your healthcare professional. [place] [address] any warranty or liability for your use of this information. DASH Diet: Care Instructions  Your Care Instructions    The DASH diet is an eating plan that can help lower your blood pressure. DASH stands for Dietary Approaches to Stop Hypertension. Hypertension is high blood pressure. The DASH diet focuses on eating foods that are high in calcium, potassium, and magnesium. These nutrients can lower blood pressure. The foods that are highest in these nutrients are fruits, vegetables, low-fat dairy products, nuts, seeds, and legumes. But taking calcium, potassium, and magnesium supplements instead of eating foods that are high in those nutrients does not have the same effect. The DASH diet also includes whole grains, fish, and poultry.   The DASH diet is one of several lifestyle changes your doctor may recommend to lower your high blood pressure. Your doctor may also want you to decrease the amount of sodium in your diet. Lowering sodium while following the DASH diet can lower blood pressure even further than just the DASH diet alone. Follow-up care is a key part of your treatment and safety. Be sure to make and go to all appointments, and call your doctor if you are having problems. It's also a good idea to know your test results and keep a list of the medicines you take. How can you care for yourself at home? Following the DASH diet  · Eat 4 to 5 servings of fruit each day. A serving is 1 medium-sized piece of fruit, ½ cup chopped or canned fruit, 1/4 cup dried fruit, or 4 ounces (½ cup) of fruit juice. Choose fruit more often than fruit juice. · Eat 4 to 5 servings of vegetables each day. A serving is 1 cup of lettuce or raw leafy vegetables, ½ cup of chopped or cooked vegetables, or 4 ounces (½ cup) of vegetable juice. Choose vegetables more often than vegetable juice. · Get 2 to 3 servings of low-fat and fat-free dairy each day. A serving is 8 ounces of milk, 1 cup of yogurt, or 1 ½ ounces of cheese. · Eat 6 to 8 servings of grains each day. A serving is 1 slice of bread, 1 ounce of dry cereal, or ½ cup of cooked rice, pasta, or cooked cereal. Try to choose whole-grain products as much as possible. · Limit lean meat, poultry, and fish to 2 servings each day. A serving is 3 ounces, about the size of a deck of cards. · Eat 4 to 5 servings of nuts, seeds, and legumes (cooked dried beans, lentils, and split peas) each week. A serving is 1/3 cup of nuts, 2 tablespoons of seeds, or ½ cup of cooked beans or peas. · Limit fats and oils to 2 to 3 servings each day. A serving is 1 teaspoon of vegetable oil or 2 tablespoons of salad dressing. · Limit sweets and added sugars to 5 servings or less a week. A serving is 1 tablespoon jelly or jam, ½ cup sorbet, or 1 cup of lemonade.   · Eat less than 2,300 milligrams (mg) of sodium a day. If you limit your sodium to 1,500 mg a day, you can lower your blood pressure even more. Tips for success  · Start small. Do not try to make dramatic changes to your diet all at once. You might feel that you are missing out on your favorite foods and then be more likely to not follow the plan. Make small changes, and stick with them. Once those changes become habit, add a few more changes. · Try some of the following:  ¨ Make it a goal to eat a fruit or vegetable at every meal and at snacks. This will make it easy to get the recommended amount of fruits and vegetables each day. ¨ Try yogurt topped with fruit and nuts for a snack or healthy dessert. ¨ Add lettuce, tomato, cucumber, and onion to sandwiches. ¨ Combine a ready-made pizza crust with low-fat mozzarella cheese and lots of vegetable toppings. Try using tomatoes, squash, spinach, broccoli, carrots, cauliflower, and onions. ¨ Have a variety of cut-up vegetables with a low-fat dip as an appetizer instead of chips and dip. ¨ Sprinkle sunflower seeds or chopped almonds over salads. Or try adding chopped walnuts or almonds to cooked vegetables. ¨ Try some vegetarian meals using beans and peas. Add garbanzo or kidney beans to salads. Make burritos and tacos with mashed segundo beans or black beans. Where can you learn more? Go to http://kasey-yamilet.info/. Enter Y635 in the search box to learn more about \"DASH Diet: Care Instructions. \"  Current as of: September 21, 2016  Content Version: 11.4  © 7631-5224 [employer]. Care instructions adapted under license by [employer] (which disclaims liability or warranty for this information). If you have questions about a medical condition or this instruction, always ask your healthcare professional. [address] any warranty or liability for your use of this information.

## 2018-02-22 NOTE — MR AVS SNAPSHOT
[facility] 
 
 
 [address], [address] [facility] 
[phone] Patient: Jimena Montalvo MRN: [id number] :1955 Visit Information Date & Time Provider Department Dept. Phone Encounter #  
 2018  9:00 AM Suha Ann MD [facility] [phone] [id number] Follow-up Instructions Return in about 6 months (around 2018), or if symptoms worsen or fail to improve. Your Appointments 2018 10:00 AM  
Office Visit with Suha Ann MD  
[facility]) Appt Note: 6 month f/u  
 [address] [address] [address]  
  
   
 [address], [address] Upcoming Health Maintenance Date Due Pneumococcal 19-64 Highest Risk (1 of 3 - PCV13) 10/18/1974 PAP AKA CERVICAL CYTOLOGY 2018 BREAST CANCER SCRN MAMMOGRAM 3/15/2019 COLONOSCOPY 2019 DTaP/Tdap/Td series (2 - Td) 2027 Allergies as of 2018  Review Complete On: 2018 By: Anna Patel Severity Noted Reaction Type Reactions Codeine    Nausea and Vomiting Macrobid [Nitrofurantoin Monohyd/m-cryst]  2015    Nausea and Vomiting Current Immunizations  Reviewed on 2018 Name Date Influenza Vaccine (Quad) PF 2018 12:16 PM  
 PPD 2011 TD Vaccine 2007 Td 2015 Tdap 2017 Zoster Vaccine, Live 2016  3:20 PM  
  
 Not reviewed this visit You Were Diagnosed With   
  
 Codes Comments Urinary frequency    -  Primary ICD-10-CM: R35.0 ICD-9-CM: 788.41 Vaginal discharge     ICD-10-CM: N89.8 ICD-9-CM: 623.5 Vitals BP Pulse Temp Resp Height(growth percentile) Weight(growth percentile) 134/70 (BP 1 Location: Left arm, BP Patient Position: Sitting) 96 99.5 °F (37.5 °C) (Oral) 20 5' 5.5\" (1.664 m) 151 lb (68.5 kg) SpO2 BMI OB Status Smoking Status 98% 24.75 kg/m2 Postmenopausal Never Smoker Vitals History BMI and BSA Data Body Mass Index Body Surface Area 24.75 kg/m 2 1.78 m 2 Preferred Pharmacy Pharmacy Name Phone [facility] [address] [facility], [address] [place] [address] [phone] Your Updated Medication List  
  
   
This list is accurate as of 2/22/18 10:38 AM.  Always use your most recent med list.  
  
  
  
  
 chromium-herb no.238 250-775 mcg-mg Tab Take  by mouth.  
  
 lisinopril 2.5 mg tablet Commonly known as:  PRINIVIL, ZESTRIL  
TAKE ONE TABLET BY MOUTH EVERY DAY  
  
 nystatin powder Commonly known as:  MYCOSTATIN Apply  to affected area two (2) times a day. To use after redness resolves to prevent recurrence. omeprazole 40 mg capsule Commonly known as:  PRILOSEC Take 1 Cap by mouth daily. red yeast rice extract 600 mg Cap Take 600 mg by mouth now. venlafaxine-SR 75 mg capsule Commonly known as:  EFFEXOR-XR Take 3 Caps by mouth daily. We Performed the Following CT/NG/T.VAGINALIS AMPLIFICATION [id number] CPT(R)] CULTURE, URINE [id number] CPT(R)] URINALYSIS W/MICROSCOPIC [50515 CPT(R)] Follow-up Instructions Return in about 6 months (around 8/22/2018), or if symptoms worsen or fail to improve. To-Do List   
 02/22/2018 Lab:  CT/NG/T.VAGINALIS AMPLIFICATION   
  
 02/22/2018 Microbiology:  CULTURE, URINE   
  
 02/22/2018 Lab:  URINALYSIS W/MICROSCOPIC Patient Instructions Eating Healthy Foods: Care Instructions Your Care Instructions Eating healthy foods can help lower your risk for disease. Healthy food gives you energy and keeps your heart strong, your brain active, your muscles working, and your bones strong.  
A healthy diet includes a variety of foods from the basic food groups: grains, vegetables, fruits, milk and milk products, and meat and beans. Some people may eat more of their favorite foods from only one food group and, as a result, miss getting the nutrients they need. So, it is important to pay attention not only to what you eat but also to what you are missing from your diet. You can eat a healthy, balanced diet by making a few small changes. Follow-up care is a key part of your treatment and safety. Be sure to make and go to all appointments, and call your doctor if you are having problems. It's also a good idea to know your test results and keep a list of the medicines you take. How can you care for yourself at home? Look at what you eat · Keep a food diary for a week or two and record everything you eat or drink. Track the number of servings you eat from each food group. · For a balanced diet every day, eat a variety of: ¨ 6 or more ounce-equivalents of grains, such as cereals, breads, crackers, rice, or pasta, every day. An ounce-equivalent is 1 slice of bread, 1 cup of ready-to-eat cereal, or ½ cup of cooked rice, cooked pasta, or cooked cereal. 
¨ 2½ cups of vegetables, especially: § Dark-green vegetables such as broccoli and spinach. § Orange vegetables such as carrots and sweet potatoes. § Dry beans (such as segundo and kidney beans) and peas (such as lentils). ¨ 2 cups of fresh, frozen, or canned fruit. A small apple or 1 banana or orange equals 1 cup. ¨ 3 cups of nonfat or low-fat milk, yogurt, or other milk products. ¨ 5½ ounces of meat and beans, such as chicken, fish, lean meat, beans, nuts, and seeds. One egg, 1 tablespoon of peanut butter, ½ ounce nuts or seeds, or ¼ cup of cooked beans equals 1 ounce of meat. · Learn how to read food labels for serving sizes and ingredients. Fast-food and convenience-food meals often contain few or no fruits or vegetables. Make sure you eat some fruits and vegetables to make the meal more nutritious. · Look at your food diary. For each food group, add up what you have eaten and then divide the total by the number of days. This will give you an idea of how much you are eating from each food group. See if you can find some ways to change your diet to make it more healthy. Start small · Do not try to make dramatic changes to your diet all at once. You might feel that you are missing out on your favorite foods and then be more likely to fail. · Start slowly, and gradually change your habits. Try some of the following: ¨ Use whole wheat bread instead of white bread. ¨ Use nonfat or low-fat milk instead of whole milk. ¨ Eat brown rice instead of white rice, and eat whole wheat pasta instead of white-flour pasta. ¨ Try low-fat cheeses and low-fat yogurt. ¨ Add more fruits and vegetables to meals and have them for snacks. ¨ Add lettuce, tomato, cucumber, and onion to sandwiches. ¨ Add fruit to yogurt and cereal. 
Enjoy food · You can still eat your favorite foods. You just may need to eat less of them. If your favorite foods are high in fat, salt, and sugar, limit how often you eat them, but do not cut them out entirely. · Eat a wide variety of foods. Make healthy choices when eating out · The type of restaurant you choose can help you make healthy choices. Even fast-food chains are now offering more low-fat or healthier choices on the menu. · Choose smaller portions, or take half of your meal home. · When eating out, try: ¨ A veggie pizza with a whole wheat crust or grilled chicken (instead of sausage or pepperoni). ¨ Pasta with roasted vegetables, grilled chicken, or marinara sauce instead of cream sauce. ¨ A vegetable wrap or grilled chicken wrap. ¨ Broiled or poached food instead of fried or breaded items. Make healthy choices easy · Buy packaged, prewashed, ready-to-eat fresh vegetables and fruits, such as baby carrots, salad mixes, and chopped or shredded broccoli and cauliflower. · Buy packaged, presliced fruits, such as melon or pineapple. · Choose 100% fruit or vegetable juice instead of soda. Limit juice intake to 4 to 6 oz (½ to ¾ cup) a day. · Blend low-fat yogurt, fruit juice, and canned or frozen fruit to make a smoothie for breakfast or a snack. Where can you learn more? Go to http://kasey-yamilet.info/. Enter T756 in the search box to learn more about \"Eating Healthy Foods: Care Instructions. \" Current as of: May 12, 2017 Content Version: 11.4 © 1112-7011 [employer]. Care instructions adapted under license by [employer] (which disclaims liability or warranty for this information). If you have questions about a medical condition or this instruction, always ask your healthcare professional. [address] any warranty or liability for your use of this information. DASH Diet: Care Instructions Your Care Instructions The DASH diet is an eating plan that can help lower your blood pressure. DASH stands for Dietary Approaches to Stop Hypertension. Hypertension is high blood pressure. The DASH diet focuses on eating foods that are high in calcium, potassium, and magnesium. These nutrients can lower blood pressure. The foods that are highest in these nutrients are fruits, vegetables, low-fat dairy products, nuts, seeds, and legumes. But taking calcium, potassium, and magnesium supplements instead of eating foods that are high in those nutrients does not have the same effect. The DASH diet also includes whole grains, fish, and poultry. The DASH diet is one of several lifestyle changes your doctor may recommend to lower your high blood pressure. Your doctor may also want you to decrease the amount of sodium in your diet. Lowering sodium while following the DASH diet can lower blood pressure even further than just the DASH diet alone. Follow-up care is a key part of your treatment and safety.  Be sure to make and go to all appointments, and call your doctor if you are having problems. It's also a good idea to know your test results and keep a list of the medicines you take. How can you care for yourself at home? Following the DASH diet · Eat 4 to 5 servings of fruit each day. A serving is 1 medium-sized piece of fruit, ½ cup chopped or canned fruit, 1/4 cup dried fruit, or 4 ounces (½ cup) of fruit juice. Choose fruit more often than fruit juice. · Eat 4 to 5 servings of vegetables each day. A serving is 1 cup of lettuce or raw leafy vegetables, ½ cup of chopped or cooked vegetables, or 4 ounces (½ cup) of vegetable juice. Choose vegetables more often than vegetable juice. · Get 2 to 3 servings of low-fat and fat-free dairy each day. A serving is 8 ounces of milk, 1 cup of yogurt, or 1 ½ ounces of cheese. · Eat 6 to 8 servings of grains each day. A serving is 1 slice of bread, 1 ounce of dry cereal, or ½ cup of cooked rice, pasta, or cooked cereal. Try to choose whole-grain products as much as possible. · Limit lean meat, poultry, and fish to 2 servings each day. A serving is 3 ounces, about the size of a deck of cards. · Eat 4 to 5 servings of nuts, seeds, and legumes (cooked dried beans, lentils, and split peas) each week. A serving is 1/3 cup of nuts, 2 tablespoons of seeds, or ½ cup of cooked beans or peas. · Limit fats and oils to 2 to 3 servings each day. A serving is 1 teaspoon of vegetable oil or 2 tablespoons of salad dressing. · Limit sweets and added sugars to 5 servings or less a week. A serving is 1 tablespoon jelly or jam, ½ cup sorbet, or 1 cup of lemonade. · Eat less than 2,300 milligrams (mg) of sodium a day. If you limit your sodium to 1,500 mg a day, you can lower your blood pressure even more. Tips for success · Start small. Do not try to make dramatic changes to your diet all at once.  You might feel that you are missing out on your favorite foods and then be more likely to not follow the plan. Make small changes, and stick with them. Once those changes become habit, add a few more changes. · Try some of the following: ¨ Make it a goal to eat a fruit or vegetable at every meal and at snacks. This will make it easy to get the recommended amount of fruits and vegetables each day. ¨ Try yogurt topped with fruit and nuts for a snack or healthy dessert. ¨ Add lettuce, tomato, cucumber, and onion to sandwiches. ¨ Combine a ready-made pizza crust with low-fat mozzarella cheese and lots of vegetable toppings. Try using tomatoes, squash, spinach, broccoli, carrots, cauliflower, and onions. ¨ Have a variety of cut-up vegetables with a low-fat dip as an appetizer instead of chips and dip. ¨ Sprinkle sunflower seeds or chopped almonds over salads. Or try adding chopped walnuts or almonds to cooked vegetables. ¨ Try some vegetarian meals using beans and peas. Add garbanzo or kidney beans to salads. Make burritos and tacos with mashed segundo beans or black beans. Where can you learn more? Go to http://kasey-yamilet.info/. Enter A979 in the search box to learn more about \"DASH Diet: Care Instructions. \" Current as of: September 21, 2016 Content Version: 11.4 © 5851-5757 [employer]. Care instructions adapted under license by [employer] (which disclaims liability or warranty for this information). If you have questions about a medical condition or this instruction, always ask your healthcare professional. [address] [address] any warranty or liability for your use of this information. Introducing [facility]! Kevin Livingston introduces [employer] patient portal. Now you can access parts of your medical record, email your doctor's office, and request medication refills online. 1. In your internet browser, go to [email or web address] 2. Click on the First Time User? Click Here link in the Sign In box. You will see the New Member Sign Up page. 3. Enter your SocialVolt Access Code exactly as it appears below. You will not need to use this code after youve completed the sign-up process. If you do not sign up before the expiration date, you must request a new code. · SocialVolt Access Code: THE [facility] Expires: 4/18/2018 10:46 AM 
 
4. Enter the last four digits of your Social Security Number (xxxx) and Date of Birth (mm/dd/yyyy) as indicated and click Submit. You will be taken to the next sign-up page. 5. Create a Wellframet ID. This will be your SocialVolt login ID and cannot be changed, so think of one that is secure and easy to remember. 6. Create a SocialVolt password. You can change your password at any time. 7. Enter your Password Reset Question and Answer. This can be used at a later time if you forget your password. 8. Enter your e-mail address. You will receive e-mail notification when new information is available in [address] 9. Click Sign Up. You can now view and download portions of your medical record. 10. Click the Download Summary menu link to download a portable copy of your medical information. If you have questions, please visit the Frequently Asked Questions section of the SocialVolt website. Remember, SocialVolt is NOT to be used for urgent needs. For medical emergencies, dial 911. Now available from your iPhone and Android! Please provide this summary of care documentation to your next provider. Your primary care clinician is listed as Fabi Walker. If you have any questions after today's visit, please call [phone].

## 2018-02-22 NOTE — PROGRESS NOTES
Chief Complaint   Patient presents with    Complete Physical     Yearly physical with lab results. Hemoglobin A1C 5.8% resulted 2/16/2018. Health Maintenance Due   Topic Date Due    Pneumococcal 19-64 Highest Risk (1 of 3 - PCV13) 10/18/1974     1. Have you been to the ER, urgent care clinic or hospitalized since your last visit? NO.     2. Have you seen or consulted any other health care providers outside of the [address] [place] [address] since your last visit (Include any pap smears or colon screening)? NO      Do you have an Advanced Directive? NO    Would you like information on Advanced Directives?  YES

## 2018-02-23 LAB
BILIRUB UR QL: NEGATIVE
BILIRUB UR QL: NEGATIVE
CHLAMYDIA TRACHOMATIS, NAA, 180097: NEGATIVE
EPITHELIAL,EPSU: ABNORMAL /HPF (ref 0–2)
EPITHELIAL,EPSU: ABNORMAL /HPF (ref 0–2)
GLUCOSE UR QL: NEGATIVE MG/DL
GLUCOSE UR QL: NEGATIVE MG/DL
HGB UR QL STRIP: ABNORMAL
HGB UR QL STRIP: ABNORMAL
KETONES UR QL STRIP.AUTO: NEGATIVE MG/DL
KETONES UR QL STRIP.AUTO: NEGATIVE MG/DL
LEUKOCYTE ESTERASE: ABNORMAL
LEUKOCYTE ESTERASE: ABNORMAL
NEISSERIA GONORRHOEAE, NAA, 180104: NEGATIVE
NITRITE UR QL STRIP.AUTO: NEGATIVE
NITRITE UR QL STRIP.AUTO: NEGATIVE
PH UR STRIP: 5 PH (ref 5–8)
PH UR STRIP: 5 PH (ref 5–8)
PROT UR QL STRIP: NEGATIVE MG/DL
PROT UR QL STRIP: NEGATIVE MG/DL
RBC #/AREA URNS HPF: ABNORMAL /HPF
RBC #/AREA URNS HPF: ABNORMAL /HPF
RESULT: NORMAL
SP GR UR: 1.01 (ref 1–1.03)
SP GR UR: 1.01 (ref 1–1.03)
TRICH VAG BY NAA, 180087: POSITIVE
UROBILINOGEN UR STRIP-MCNC: <2 MG/DL
UROBILINOGEN UR STRIP-MCNC: <2 MG/DL
WBC URNS QL MICRO: ABNORMAL /HPF (ref 0–2)
WBC URNS QL MICRO: ABNORMAL /HPF (ref 0–2)

## 2018-02-23 NOTE — TELEPHONE ENCOUNTER
Received call for patient earlier this evening. Discussed at length the stressors in her life and bills she had received from her recent neck surgery at [facility]. She also discussed her medications, and asked if she should be taking gabapentin as prescribed by neurosurgery. Inquired the reason it was prescribed, and she stated \"nerve pain\". She stated that she was not taking it and was not experiencing any pain. Discussed that she did not need to take it since her pain had resolved. Complained of worsening reflux symptoms. Requested that [address] [place] [address] script be sent to [facility] as had been effective in past in controlling them. However upon ordering, stated that not in the pharmacy formulary. Will continue on omeprazole for now.

## 2018-02-23 NOTE — TELEPHONE ENCOUNTER
Patient returned call. Discussed that STI testing performed at her visit had not yet returned. Discussed that will contact when results available. She also discussed that she is now able to wear a soft collar at night as per neurosurgery. No other complaints.

## 2018-02-24 RX ORDER — METRONIDAZOLE 500 MG/1
2000 TABLET ORAL ONCE
Qty: 4 TAB | Refills: 0 | Status: SHIPPED | OUTPATIENT
Start: 2018-02-24 | End: 2018-02-24

## 2018-02-24 NOTE — TELEPHONE ENCOUNTER
Results of STI testing returned. NAAT negative for gonorrhea and chlamydia, but positive for Trichomonas vaginalis. Will need treatment with Flagyl 500 mg 4 tablets po x 1. Will also need complete testing for other STI including syphilis, Hepatitis B/C, and HIV. Called and spoke with patient. Informed of positive trichomonas infection. Discussed need for blood testing for other sexually transmitted diseases. Also, discussed need to have sexual partner treated to prevent reinfection. Advised to use condoms to prevent further STI's. Script for Flagyl sent to [place]. Patient will . Please schedule lab appointment for next week for blood testing. Also, please inform her that an abdominal CT scan that she had when hospitalized at [facility] in 1/2018 showed a left ovarian cyst and it is recommended that she have a pelvic ultrasound to evaluate it. Order placed and please ask her to schedule. Thanks.

## 2018-02-25 PROBLEM — N94.9 ADNEXAL CYST: Status: ACTIVE | Noted: 2018-02-25

## 2018-02-25 PROBLEM — S12.300A C4 CERVICAL FRACTURE (HCC): Status: ACTIVE | Noted: 2018-02-25

## 2018-02-25 PROBLEM — S12.400A: Status: ACTIVE | Noted: 2018-02-25

## 2018-02-25 NOTE — PROGRESS NOTES
HPI:   Adrián Caraballo is a 58y.o. year old female who presents today for a physical exam. She has a history of hypertension, hyperlipidemia, bladder cancer, depression, and alcohol abuse. She was last seen on 1/18/2018 after presenting to [facility] ED on 1/15/2018 complaining of right foot pain and bilateral lower extremity swelling. She was noted to have multiple excoriated lesions, which were felt to be due to scratching. Lab evaluation showed WBC 8.5, Hb 12.5/ Hct 37.6, creatinine 0.6/ eGFR >60, glucose 109. She was diagnosed with right foot cellulitis and treated with Keflex. She was noted during the visit to be exhibiting symptoms of sharyn, and she reported that she was no longer taking olanzapine, but her dose of venlafaxine had been increased to 300 mg daily by Dr. Andra Watson. On 1/22/2018, she was admitted to the trauma unit at [facility] after finding herself in a pool of blood in her bed. She stated that she was not sure what events transpired to cause her injuries. Chart reviewed in Care Everywhere. ED notes stated that she presented with altered mental status (A&O x 0) and a nosebleed. She had a seizure in the ED. Testing in ED showed: Head CT scan showed high left parietal scalp hematoma; Cervical spine CT scan showed mildly displaced fracture of the C5 spinous process and nondisplaced fracture through the inferior articular process of the right C4 articular pillar; Facial CT scan showed depressed nasal bone fractures, left greater than right, and paranasal soft tissue edema and emphysema; Abdominal/pelvic CT scan negative for acute injury;  Head MRI showed no acute intracranial process; large left parasagittal parietal vertex scalp hematoma; soft tissue swelling over the nasal bridge with associated facial bone fractures; cervical MRI showed short segment of cord edema suspected at C5-C6 level, prevertebral edema with possible anterior longitudinal ligament disruptions at C4-C5 and C6-C7, severe spinal canal stenosis causing up to mild cord compression at C4-C5 and C5-C6 levels. Her ammonia level was elevated in the ED, and her mental status improved following a dose of lactulose. She had sutures placed for a lip laceration. Due to left deltoid weakness, she was taken to the OR by Dr. Darci Roy on 1/26/2018 for anterior cervical discectomy and fusion of C3-C7. EEG awake and asleep (1/24/2018) was obtained which was normal. She was discharged on 1/28/2018. She has had progressively worsening symptoms of sharyn following discharge and was noted to be quite symptomatic upon presenting to our office for her pre-visit labs on 2/15/2018. Dr. Puja Lay office was contacted and she was scheduled to see him the following morning. He recommended that she wean off Effexor and begin Depakote for treatment of her sharyn. However, she refused to start Depakote due to its possible side effects and became quite belligerent in his office. She reports today that she has started weaning Effexor and states that she never started the Depakote. She admits to drinking wine again, but states that she is only having a few glasses at night. She states that she is no longer taking the oxycodone or gabapentin prescribed after her surgery since she states that her pain has resolved. She describes improvement in her left arm weakness and states that she is able to move it now without difficulty. She does describe a yellow vaginal discharge, and states that it has been painful for several months when she has intercourse. She is otherwise without complaints. She has a long history of depression and alcohol abuse. She reports that she is still involved in a difficult divorce which involved a restraining order against her. Over the last several years, she was arrested multiple times for driving under the influence and was hospitalized several times for suicidal ideation while intoxicated.  She entered a detox treatment center in 2015 for alcohol abuse, and successfully stopped drinking. She denies any tobacco or drug use. She was tried on multiple different anti-depressants since 6/2016 without effect, and she had refused to see an outpatient psychiatrist, which ultimately lead to hospitalization on 12/3/2016 at [facility], but she checked out AMA. She was subsequently hospitalized voluntarily from 12/23/2016-1/3/2017 at [facility]. She was treated with venlafaxine and olanzapine with significant improvement. She denies any depressed symptoms, suicidal thoughts, or homicidal thoughts currently. She was evaluated on 6/2/2016 for a single encounter of unprotected sexual intercourse with a 21year old male. She reported that he had called her several days later and claimed that he was having symptoms of acute HIV and accused her of giving it to him. She was tested for sexually transmitted infections, including HIV, hepatitis B and C, and syphilis, which were all negative. She had a pelvic exam and testing for gonorrhea, chlamydia and trichomonas were also negative. She has annual well women exams with her Dr. Stephanie Rod. In 7/2017, she was evaluated by JOSEPH Epps for multiple unprotected sexual encounters and STI testing, for gonorrhea, chlamydia, HIV, hepatitis B and C, and syphilis were again negative. She has a history of bladder cancer, diagnosed with Clinical stage Ta, well differentiated urothelial carcinoma of the bladder in 04/2011. She underwent transurethral resection of all visible bladder tumor (TURBT) alone without intravesicle therapy. She is followed by Dr. Sarah Lopez and has persistent microscopic hematuria. She had a CT scan of the abdomen/pelvis 12/01/15 which showed no bladder abnormalities. She underwent repeat cystoscopy on 1/9/2018 by Dr. Sarah Lopez, which was negative for recurrent lesions. Urine cytology and CT urogram were also ordered. She denies any gross hematuria, dysuria, or flank pain.       She has hypertension treated with lisinopril. She does not monitor her blood pressure at home. She is not currently exercising, and denies any chest pain, shortness of breath, palpitations, lightheadedness, or edema. She also has a history of hyperlipidemia. She takes over the counter red yeast rice extract for this and is not interested in any prescription medication. She underwent screening colonoscopy in 4/2014 performed by Dr. Watson Goodell, showing a few sigmoid diverticula, and a 4mm sessile polyp was removed from the descending colon (pathology: tubular adenoma). Follow-up colonoscopy recommended for 5 years. She was taking dexlansoprazole for reflux symptoms, but she stated that her symptoms had resolved so she stopped taking it. She did begin taking omeprazole 20 mg for increased symptoms since dexlansoprazole was too expensive. She denies any abdominal pain, nausea, vomiting, melena, hematochezia, or change in bowel movements. Past Medical History:   Diagnosis Date    Alcohol abuse     Bladder cancer ([facility] [address] 04/2011    Clinical stage Ta, well differentiated urothelial carcinoma of the bladder s/p TURBT. Dr. Scruggs Lax Depression     FH: breast cancer     Hematuria, microscopic     Hypercholesteremia     Hypertension     Osteopenia     Vitamin D deficiency      Past Surgical History:   Procedure Laterality Date    CYSTOSCOPY  4/27/12    ENDOSCOPY, COLON, DIAGNOSTIC      HX UROLOGICAL       Current Outpatient Prescriptions   Medication Sig    lisinopril (PRINIVIL, ZESTRIL) 2.5 mg tablet TAKE ONE TABLET BY MOUTH EVERY DAY    omeprazole (PRILOSEC) 40 mg capsule Take 1 Cap by mouth daily.  nystatin (MYCOSTATIN) powder Apply  to affected area two (2) times a day. To use after redness resolves to prevent recurrence.  venlafaxine-SR (EFFEXOR-XR) 75 mg capsule Take 3 Caps by mouth daily.  chromium-herb no.238 250-775 mcg-mg tab Take  by mouth.     red yeast rice extract 600 mg cap Take 600 mg by mouth now. No current facility-administered medications for this visit. Allergies and Intolerances: Allergies   Allergen Reactions    Codeine Nausea and Vomiting    Macrobid [Nitrofurantoin Monohyd/M-Cryst] Nausea and Vomiting     Family History: Reports that her mother has triple negative breast cancer/ BRCA negative. Family History   Problem Relation Age of Onset    Cancer Mother      breast    Stroke Mother     Cancer Father      lung    Cancer Sister      Social History:   She  reports that she has never smoked. She has never used smokeless tobacco. She has a history of alcohol abuse but reports that she is no longer drinking. She is living in an apartment in [place] and completed the divorce from her . No children. Retired . History   Alcohol Use    14.0 oz/week    28 Glasses of wine per week     Comment: wine 4 glasses a day     Immunization History:  Immunization History   Administered Date(s) Administered    Influenza Vaccine (Quad) PF 01/18/2018    PPD 09/06/2011    TD Vaccine 07/27/2007    Td 02/26/2015    Tdap 06/20/2017    Zoster Vaccine, Live 05/13/2016       Review of Systems:   As above included in HPI. Otherwise 11 point review of systems negative including constitutional, skin, HENT, eyes, respiratory, cardiovascular, gastrointestinal, genitourinary, musculoskeletal, endo/heme/aller, neurological.    Physical:   Vitals:   BP: 134/70  HR: 96  WT: 151 lb (68.5 kg)  BMI:  24.75 kg/m2    Exam:   Patient appears in no apparent distress. Affect is appropriate. HEENT --Anicteric sclerae, tympanic membranes normal,  ear canals normal.  PERRL, EOMI, conjunctiva and lids normal.   Sinuses were nontender, turbinates normal, hearing normal.  Oropharynx without  erythema, normal tongue, oral mucosa and tonsils. No cervical lymphadenopathy. No thyromegaly, JVD, or bruits. Carotid pulses 2+ with normal upstroke. Lungs --Clear to auscultation.  No wheezing or rales.  Heart --Regular rate and rhythm, no murmurs, rubs, gallops, or clicks. Breasts -- no masses, skin dimpling, asymmetry, nipple discharge, nodules, axillary lymphadenopathy. Chest wall --Nontender to palpation. PMI normal.  Abdomen -- Soft and nontender, no hepatosplenomegaly or masses. Pelvis -- normal external genitalia, erythema of vaginal wall and cervix noted; clear discharge. No adnexal tenderness. Extremities -- Trace edema. 2+ pulses equally and bilaterally. Normal looking digits, ROM intact  Neuro -- CN 2-12 intact, strength 5/5 with intact soft touch in all extremities  Derm - no obvious abnormalities noted, no rash    Review of Data:  Labs:  None     Orders Only on 02/15/2018   Component Date Value Ref Range Status    WBC 02/15/2018 7.7  4.0 - 11.0 K/uL Final    RBC 02/15/2018 4.03  3.80 - 5.20 M/uL Final    HGB 02/15/2018 10.9* 11.7 - 16.0 g/dL Final    HCT 02/15/2018 35.7  35.1 - 48.0 % Final    MCV 02/15/2018 89  80 - 95 fL Final    MCH 02/15/2018 27  26 - 34 pg Final    MCHC 02/15/2018 31* 32 - 36 g/dL Final    RDW 02/15/2018 16.7* 10.0 - 16.0 % Final    PLATELET 66/94/9441 626* 140 - 440 K/uL Final    MPV 02/15/2018 10.7  6.0 - 10.8 fL Final    NEUTROPHILS 02/15/2018 72  40 - 75 % Final    Lymphocytes 02/15/2018 17* 27 - 45 % Final    MONOCYTES 02/15/2018 7  3 - 9 % Final    EOSINOPHILS 02/15/2018 3  0 - 6 % Final    BASOPHILS 02/15/2018 1  0 - 2 % Final    ABS. NEUTROPHILS 02/15/2018 5.6  1.8 - 7.7 K/uL Final    ABSOLUTE LYMPHOCYTE COUNT 02/15/2018 1.3  1.0 - 4.8 K/uL Final    ABS. MONOCYTES 02/15/2018 0.5  0.1 - 0.9 K/uL Final    ABS. EOSINOPHILS 02/15/2018 0.2  0.0 - 0.5 K/uL Final    ABS.  BASOPHILS 02/15/2018 0.0  0.0 - 0.2 K/uL Final    Triglyceride 02/15/2018 81  40 - 149 mg/dL Final    HDL Cholesterol 02/15/2018 98* 40 - 59 mg/dL Final    Cholesterol, total 02/15/2018 214* 110 - 200 mg/dL Final    CHOLESTEROL/HDL 02/15/2018 2.2  0.0 - 5.0 Final    LDL, calculated 02/15/2018 100* 50 - 99 mg/dL Final    VLDL, calculated 02/15/2018 16  8 - 30 mg/dL Final    Glucose 02/15/2018 178* 70 - 99 mg/dL Final    BUN 02/15/2018 10  6 - 22 mg/dL Final    Creatinine 02/15/2018 0.7* 0.8 - 1.4 mg/dL Final    Sodium 02/15/2018 144  133 - 145 mmol/L Final    Potassium 02/15/2018 4.1  3.5 - 5.5 mmol/L Final    Chloride 02/15/2018 104  98 - 110 mmol/L Final    CO2 02/15/2018 25  20 - 32 mmol/L Final    AST (SGOT) 02/15/2018 19  10 - 37 U/L Final    ALT (SGPT) 02/15/2018 19  5 - 40 U/L Final    Alk. phosphatase 02/15/2018 101  40 - 120 U/L Final    Bilirubin, total 02/15/2018 0.2  0.2 - 1.2 mg/dL Final    Calcium 02/15/2018 9.5  8.4 - 10.5 mg/dL Final    Protein, total 02/15/2018 6.9  6.2 - 8.1 g/dL Final    Albumin 02/15/2018 4.0  3.5 - 5.0 g/dL Final    A-G Ratio 02/15/2018 1.4  1.1 - 2.6 ratio Final    Globulin 02/15/2018 2.9  2.0 - 4.0 g/dL Final    Anion gap 02/15/2018 15.0  mmol/L Final    GFRAA 02/15/2018 >60.0  >60.0 Final    GFRNA 02/15/2018 >60.0  >60.0 Final    TSH 02/15/2018 0.59  0.27 - 4.20 mcU/mL Final    VITAMIN D, 25-HYDROXY 02/15/2018 31.4* 32.0 - 100.0 ng/mL Final    HIV -1/0/2 AG/AB WITH REFLEX 02/15/2018 Non Reactive  Non Reacti Final    HIV INTERPRETATION 02/15/2018 HIV-1 antigen and HIV 1/2 antibodies not detected. No laboratory evidence of   Final    Hemoglobin A1c 02/15/2018 5.8  4.8 - 5.9 % Final    AVG GLU 02/15/2018 119  91 - 123 mg/dL Final        Calculated 10 year ASCVD risk score: 4.5 %    Health Maintenance:  Screening:    Mammogram: negative (3/2017)   PAP smear: well women exam (3/2017) by Estrella Guerin [facility]   Colorectal: colonoscopy (3/2014) tubular adenoma. Dr. Lubna Martin. Due 2019.    Depression:  On Effexor/ Zyprexa   DM (HbA1c/FPG): HbA1c 5.8 (2/2018)    Hepatitis C: negative (7/2017)   Falls: none   DEXA: osteopenia (9/2011)   Glaucoma: unknown   Smoking: none   Vitamin D: 31.4 (2/2018)   Medicare Wellness: N/A      Impression:  Patient Active Problem List   Diagnosis Code    Allergic rhinitis J30.9    Dyslipidemia E78.5    HCD (hypertensive cardiovascular disease) I11.9    Osteopenia M85.80    FH: breast cancer Z80.3    Bladder cancer ([facility] [address] C67.9    GERD (gastroesophageal reflux disease) K21.9    Microhematuria R31.29    Vitamin D insufficiency E55.9    Family history of colon cancer Z80.0    Mal de debarquement R42    Depression F32.9    Essential hypertension I10    Unprotected sexual intercourse Z72.51    Abnormal glucose R73.09    Recurrent depression ([facility] [address] F33.9       Plan:  1. Trauma s/p cervical fracture. Unclear events surrounding recent injuries. Underwent anterior cervical discectomy and fusion of C3-C7 by Dr. Mayte Kirkpatrick and recovering well. Regained function of left arm. Facial injuries with lip laceration and nasal fractures healed. No recurrence of seizure and EEG normal. Will continue to follow. 2. Bipolar disorder. Patient currently appears to be in manic phase, and refused treatment with Depakote as recommended by Dr. Alejandra Garcia. Weaning Effexor. States has resumed drinking wine, approximately four glasses per day, but not taking oxycodone or gabapentin. Will need to follow closely. 3. Hypertension. Blood pressure well controlled on current dose of lisinopril. Renal function normal in [facility] ED with creatinine 0.7/ eGFR > 60. Continue to follow. 4. Dyslipidemia. Calculated 10 year ASCVD risk is 4.5 %, which does not place her in one of the four statin benefit groups as per new AHA/ACC guidelines. Also, very high HDL > LDL. Thus, would not recommend treatment with statin at this time. Emphasized importance of lifestyle modifications, including diet, exercise, and weight loss. Continue to follow. 5. Lower extremity edema. Very mild and patient describes worsening throughout the day and resolving overnight. Most consistent with venous insufficiency.  Discussed conservative measures including low salt diet and elevation. Given prescription for light support compression hose. Continue to follow. 6. Osteopenia. Last bone density scan 9/2011. Using femoral neck T-scores, calculated FRAX score estimates her 10 year risk of a major osteoporetic fracture at 6.1 % and hip fracture at 1.1 %, which are not an indication for biphosphonate treatment (>20% and >3%, respectively). Continue calcium and Vitamin D. Encouraged exercise, particularly weight bearing activities. Will readdress repeat bone density scan at next visit. 7. Bladder cancer. Continuing to follow-up with Dr. Ericak Kimble, now yearly. Urinalysis with microscopic hematuria. Last cystoscopy 1/2018 negative. CT urogram ordered by Dr. Ericka Kimble, but patient has not yet scheduled. Will readdress at next visit. Also, urine cytology specimen inadequate. Will discuss at next visit. 8. Abnormal glucose. Fasting blood sugar elevated but patient states that she was not fasting. HbA1c normal at 5.8. Discussed lifestyle modifications, particularly heart healthy diet and exercise. Will continue to follow. 9. Reflux. Responding to omeprazole 40 mg. Prefers Katelin Camper but too expensive. Abdominal CT scan 12/2015 revealed hiatal hernia. Encouraged small healthy meals and elevation of bed at night. 10. Abnormal pelvic exam. Erythematous vaginal wall and cervix. Clear discharge. Will send for gonorrhea, chlamydia, and trichomonas testing. 11. Left adnexal cyst. Abdominal CT scan during hospitalization 1/2018 showed 1.1 cm left adnexal cyst. Recommended pelvic ultrasound to evaluate given her age. Will order. 12. Health maintenance. Already received influenza vaccine today. Other immunizations up to date. Mammogram due in 3/2018. Pap performed through gyn. Colonoscopy due 2019. Vitamin D level normal. Patient reports she is taking a prenatal vitamin. Encouraged calcium and Vitamin D supplements as well.  Discussed need to avoid high risk behaviors. Will inquire about regular eye exams at next visit. Total time: 40 minutes spent with the patient in face-to-face consultation of which greater than 50% was spent on counseling, answering questions and/or coordination of care. Complex medical review and management performed. Patient understands recommendations and agrees with plan. Follow-up in 6 months.

## 2018-02-26 ENCOUNTER — TELEPHONE (OUTPATIENT)
Dept: INTERNAL MEDICINE CLINIC | Age: 63
End: 2018-02-26

## 2018-02-26 NOTE — TELEPHONE ENCOUNTER
Pt called around 12:40am w/ c/o nausea but no vomiting. Pt instructed to have a bland diet and to advance her diet as tolerated. She associated her sx with taking metronidazole.     Dr. Tabitha Luevano  [facility] [address].  Phone: [phone]  Fax: [phone]

## 2018-02-26 NOTE — TELEPHONE ENCOUNTER
Pt stated tht she took the 4 flagyl tht she was prescribed and she has been having a bad headache on the top of her head since Saturday, she has tried all kinds of meds aspirin, tylenol and she is still having the headache pain,pls adv, Khoa Sotomayor

## 2018-02-26 NOTE — TELEPHONE ENCOUNTER
Spoke with patient. Reports mild dull headache since taking dose of Flagyl. States that trying to eat well and drink fluids. Did not have any alcohol last night, but not sleeping well. Advised to continue to drink plenty of fluids and monitor for any worsening. Also discussed finding on abdominal CT scan at [facility] of 1.1 cm left adnexal cyst and need for pelvic ultrasound. Patient states that she will schedule.

## 2018-02-28 ENCOUNTER — TELEPHONE (OUTPATIENT)
Dept: INTERNAL MEDICINE CLINIC | Age: 63
End: 2018-02-28

## 2018-02-28 LAB
HBV SURFACE AB SER RIA-ACNC: NORMAL
HCV AB SER IA-ACNC: NORMAL
HEP B CORE AB, TOTAL, 006718: NORMAL
HEP B SURFACE AG SCRN, 006510: NORMAL

## 2018-03-02 NOTE — TELEPHONE ENCOUNTER
Mike Vazquez MD   c Nurses 16 minutes ago (11:49 AM)                 She can take Tylenol for migraine OTC with her other medicine (Routing comment)                        Unable to leave message for pt to call back to give message above.

## 2018-03-02 NOTE — TELEPHONE ENCOUNTER
She is still asking to speak to someone about her headaches.,    Raj Oneill can you please advise your nurse what to tell Ms. Roblesashok Cano.

## 2018-03-02 NOTE — TELEPHONE ENCOUNTER
Pt calling again, says she took 2 tylenol at 6:30 this morning and drank coffee. Wants to know if she should take tylenol migraine otc? Can she take with her other meds?

## 2018-03-02 NOTE — TELEPHONE ENCOUNTER
Pt calling again asking to speak to Dr. Tyra Kim or Sindi Dumontr. Wants her lab results for her std. Also says she has a bad headache and wants to know what she can do about it. Says she told Dr. Tyra Kim she was getting them. Not sure if it is a migraine. Pt notified Dr Tyra Kim is out on vacation until next week.

## 2018-03-03 ENCOUNTER — HOSPITAL ENCOUNTER (OUTPATIENT)
Dept: ULTRASOUND IMAGING | Age: 63
Discharge: HOME | End: 2018-03-03
Attending: INTERNAL MEDICINE
Payer: COMMERCIAL

## 2018-03-03 DIAGNOSIS — N94.9 ADNEXAL CYST: ICD-10-CM

## 2018-03-03 PROCEDURE — 76830 TRANSVAGINAL US NON-OB: CPT

## 2018-03-05 LAB — TREPONEMA PALLIDUM AB: NON REACTIVE

## 2018-03-06 ENCOUNTER — TELEPHONE (OUTPATIENT)
Dept: INTERNAL MEDICINE CLINIC | Age: 63
End: 2018-03-06

## 2018-03-06 ENCOUNTER — PATIENT OUTREACH (OUTPATIENT)
Dept: INTERNAL MEDICINE CLINIC | Age: 63
End: 2018-03-06

## 2018-03-06 NOTE — PROGRESS NOTES
Goals Addressed             Most Recent     COMPLETED: Prevent complications post hospitalization. On track (3/6/2018)             No admissions within 30 days post discharge, 1/28/2018  Episode closed: no hospitalization or ED admission post 30 days from discharge.

## 2018-03-06 NOTE — TELEPHONE ENCOUNTER
Pt is Dr. Virginia Medellin' pt, she is angry because she said no one is returning her calls, she is constantly wanting  to spk to [facility] who is unavailable at the moment,  Pt wants a mood enhancing drug tht was prescribed  by her psychiatrist who she is no longer seeing, she is asking for the on-call Dr for tomorrow which is Dr. Landaverde Standing, message will be forwarded to a nurse to adv her tht she needs to see her own pcp to req any type of meds behavioral meds

## 2018-03-06 NOTE — TELEPHONE ENCOUNTER
Patient is asking to speak to [facility] but I'm unsure why. She just said that she isn't doing well. She stated that she was supposed to call him all day yesterday according to the people she was talking to during the night.   ??

## 2018-03-06 NOTE — TELEPHONE ENCOUNTER
I was verbally informed by , Ms. Ginna Mccrary, that the patient kept calling since last night and wanted to get an anxiety medication and that the doctors on call cannot refill it until Dr. Merlene Villegas returns. I called and left a message on patient's home answering service for her to give us a call back tomorrow around the hours of 8am - 4:30pm and if she has an emergency to please go to the emergency room and if she is seen and discharged to please give us a call back regarding the reason why she went to the emergency room.

## 2018-03-06 NOTE — TELEPHONE ENCOUNTER
Please let her know that the labs drawn show no signs of infection. She was checked for Hepatitis B and C and syphilis. Also, please let her know that the pelvic ultrasound showed that the cyst that was seen on her left ovary is a simple appearing cyst and is benign. Thanks.

## 2018-03-08 ENCOUNTER — TELEPHONE (OUTPATIENT)
Dept: INTERNAL MEDICINE CLINIC | Age: 63
End: 2018-03-08

## 2018-03-08 NOTE — TELEPHONE ENCOUNTER
The pt called after hours (1:48am) with c/o N/V. She also wanted to discuss her overall medical care. Given that our office is closed and there is no pharmacy that is open at this time, I instructed her to go to the ED for evaluation of N/V sx. She declined stating that it is too expensive. \"Can't you just call something in for me to  now? \" I discussed how pharmacies are typically closed at the time of the call. She then hung up the phone.     Dr. Talha Bran  [facility], [address] [place] [address], [address] [place] [address].  Phone: [phone]  Fax: [phone]

## 2018-03-12 ENCOUNTER — TELEPHONE (OUTPATIENT)
Dept: INTERNAL MEDICINE CLINIC | Age: 63
End: 2018-03-12

## 2018-03-12 RX ORDER — DEXLANSOPRAZOLE 60 MG/1
60 CAPSULE, DELAYED RELEASE ORAL DAILY
Qty: 90 CAP | Refills: 1 | Status: SHIPPED | OUTPATIENT
Start: 2018-03-12 | End: 2018-03-16 | Stop reason: SDUPTHER

## 2018-03-12 NOTE — TELEPHONE ENCOUNTER
Patient calling asking for a call back from Gene or a nurse. Says she has been real sick this past week. Wants to talk about her operation from Jan 22 with the neurologists and plastic surgeon. Says she also came off some meds on her own.      Wants patria called in for her    Patient would like a call back today before 5

## 2018-03-12 NOTE — TELEPHONE ENCOUNTER
Called and spoke with patient. Discussed that would like to switch to Dexilant for GERD symptoms despite the cost. She checked with [facility] and states that 3 month supply affordable. Will send to mail order. Also discussed that she has now weaned herself off Effexor as instructed by psychiatry. Discussed at length current living situation and personal problems. Discussed that she should call office during business hours and not call after hours unless emergency. Patient agreeable.

## 2018-03-14 ENCOUNTER — TELEPHONE (OUTPATIENT)
Dept: INTERNAL MEDICINE CLINIC | Age: 63
End: 2018-03-14

## 2018-03-14 NOTE — TELEPHONE ENCOUNTER
Patient stating was told in the past that she was anemic. She doesn't know if she still is or not. She wants to know if it would be ok for her to give blood at her [facility].

## 2018-03-14 NOTE — TELEPHONE ENCOUNTER
She was mildly anemic in 2/2018 following blood loss from injuries and neck surgery in 1/2018. Would not recommend that she donate blood currently until fully recovered. Please let her know. Thanks.

## 2018-03-15 ENCOUNTER — TELEPHONE (OUTPATIENT)
Dept: INTERNAL MEDICINE CLINIC | Age: 63
End: 2018-03-15

## 2018-03-15 NOTE — TELEPHONE ENCOUNTER
Call from [facility] they are inq about rx dexilantt, it is costing the pt $120 per month and they are calling about a cost savings opportunity.  [place]

## 2018-03-16 RX ORDER — DEXLANSOPRAZOLE 60 MG/1
60 CAPSULE, DELAYED RELEASE ORAL DAILY
Qty: 30 CAP | Refills: 5 | Status: SHIPPED | OUTPATIENT
Start: 2018-03-16 | End: 2018-07-18

## 2018-03-16 NOTE — TELEPHONE ENCOUNTER
Called patient and verified both full name and date of birth. I let the patient know of the cost of the [address] and she stated that she has tried other medications in the past that hasn't worked and she doesn't mind the cost of the [facility] medication. She would like the Dexilant called into [facility] as soon as possible. Called [facility], and talked to [employer]. Sergio, and informed him that the patient would like to continue with the new order for Dexilant, due to her trying other medications in the past and them not working, and she is willing to pay the cost for the new medication Dexilant. He verbalized understanding and stated that he will make a note of the message.

## 2018-03-16 NOTE — TELEPHONE ENCOUNTER
Pharmacy techRod, from [facility] called and stated that to be more cost efficient, would you prefer the patient go on Ompeprazole, Pantoprazole, Lansoprazole, Rabeprazole or Esomeprazole instead of the Dexilant, due to the cost.     [phone] ref.  # [id number]

## 2018-03-16 NOTE — TELEPHONE ENCOUNTER
Pt called back and stated tht nothing else works for her but Fransico Mann is asking if it can just be called into [facility] on file and not [facility] she will just see how much it costs locally to get it filled , Zari Rivero

## 2018-03-16 NOTE — TELEPHONE ENCOUNTER
She has been on omeprazole most recently and it has not been effective. She requested Israel Frances as this is what has worked for her in the past. She is aware of the cost. Please call and ask her if she would be willing to try any of the others (Nexium, Prevacid, Protonix, or Aciphex). Thanks.

## 2018-03-16 NOTE — TELEPHONE ENCOUNTER
Pt called gave her the message, she will call [facility] and cx that    Pt has met out of pocket deductible will bring in her letter

## 2018-03-16 NOTE — TELEPHONE ENCOUNTER
Please let her know that script sent to [facility] for 30 day supply. Script has also already been ordered to [facility]. Please let her know that she will need to call them to cancel it. Thanks.

## 2018-03-22 ENCOUNTER — TELEPHONE (OUTPATIENT)
Dept: INTERNAL MEDICINE CLINIC | Age: 63
End: 2018-03-22

## 2018-03-22 NOTE — TELEPHONE ENCOUNTER
Met out of pocket- she says she needs to go to [facility]- some place  that she can walk to- her mother is dying- she needs something near [facility] that she can walk to- please advise

## 2018-03-23 NOTE — TELEPHONE ENCOUNTER
Called and verified patient's full name and date of birth. I informed patient of Dr. Virginia Medellin' message and gave her the address and phone number, patient states she will call them to set up an appointment.

## 2018-03-23 NOTE — TELEPHONE ENCOUNTER
Please let the patient know that [facility] has an office in [place] in [facility] at [address]. It is near [address] [facility]. To make an appointment, the phone number is [phone]. Please ask her to let us know if she needs a referral. Thanks.

## 2018-03-23 NOTE — TELEPHONE ENCOUNTER
I found another option online, but have not had experience with them. It is also in [place]:     [facility]  [address]  [address]:[phone]. Please ask her to see if have sooner availability.

## 2018-03-23 NOTE — TELEPHONE ENCOUNTER
Called patient and verified full name and date of birth. Informed patient of Dr. Tyra Kim' message of new place. Patient states she was in a car without anything to write with and for me to call right back and put the information on the answering machine. I gave the info from Dr. Tyra Kim to the patient's voicemail.

## 2018-03-23 NOTE — TELEPHONE ENCOUNTER
Patient calling back says the number she was given for counseling is scheduling out for 4-8 weeks. She says she doesn't have 4-8 weeks her mother is passing. And she said no meds. Wants  to know its time for plan B.     Pls Advise

## 2018-03-28 ENCOUNTER — TELEPHONE (OUTPATIENT)
Dept: INTERNAL MEDICINE CLINIC | Age: 63
End: 2018-03-28

## 2018-03-28 NOTE — TELEPHONE ENCOUNTER
Received call from patient last night on call. She was very emotional in explaining that her mother had passed away earlier that evening. Discussed at length ways to help her deal with her feelings, and advised her to schedule with psychologist for further counseling. Information provided earlier in the week at previous telephone encounter.

## 2018-03-28 NOTE — TELEPHONE ENCOUNTER
Pt called again today but asked for BM, she didn't leave a detailed message just wanted to spk to him, just letting you know, Cris Villeda, Alyssa Sewell

## 2018-03-29 ENCOUNTER — TELEPHONE (OUTPATIENT)
Dept: INTERNAL MEDICINE CLINIC | Age: 63
End: 2018-03-29

## 2018-03-29 NOTE — TELEPHONE ENCOUNTER
Called oncall phone. She reported anxiety and neck pain, she did not report SI/HI, dyspnea. She reports out of Xanax and asked to have it called in. I discussed I do not call in controlled medication however I would have her PCP address it when the office opens in the morning. She reported left a message earlier today regarding anxiety for ENZO Logan. She was disruptive, argumentative, and making insulting comments during our conversation. She hung up during our conversation.

## 2018-03-30 ENCOUNTER — TELEPHONE (OUTPATIENT)
Dept: INTERNAL MEDICINE CLINIC | Age: 63
End: 2018-03-30

## 2018-03-30 NOTE — TELEPHONE ENCOUNTER
Patient returned call. Discussed swelling in her ankle. Discussed need to avoid salt in diet. Admitted that she has been eating out every meal and has been adding salt to her food. Will change diet. Also, reports that started Dexilant and GERD symptoms significantly improved. Also, reports that was able to schedule appointment with [facility] in [facility] for next Thursday 4/5.

## 2018-03-30 NOTE — TELEPHONE ENCOUNTER
Pt called and stated tht her left ankle is still swelling and she is wearing the compression hose, pls italo, [place]

## 2018-04-25 ENCOUNTER — TELEPHONE (OUTPATIENT)
Dept: INTERNAL MEDICINE CLINIC | Age: 63
End: 2018-04-25
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
## 2018-04-25 NOTE — TELEPHONE ENCOUNTER
Attempted to call patient. Left message to call back. Will recommend resumption of physical therapy if shoulder pain is recurring.

## 2018-04-30 ENCOUNTER — TELEPHONE (OUTPATIENT)
Dept: INTERNAL MEDICINE CLINIC | Age: 63
End: 2018-04-30

## 2018-04-30 NOTE — TELEPHONE ENCOUNTER
Pt called stated tht the  gave her an order for mental health treatment, she said she needs to know where to go, she has a resource sheet but she said she has been getting turned away, [employer]

## 2018-05-02 ENCOUNTER — TELEPHONE (OUTPATIENT)
Dept: INTERNAL MEDICINE CLINIC | Age: 63
End: 2018-05-02

## 2018-05-02 RX ORDER — LISINOPRIL 2.5 MG/1
2.5 TABLET ORAL DAILY
Qty: 90 TAB | Refills: 1 | Status: SHIPPED | OUTPATIENT
Start: 2018-05-02 | End: 2018-10-19 | Stop reason: SDUPTHER
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
## 2018-05-02 NOTE — PROGRESS NOTES
Called on call regarding swollen ankles. Denies chest pain, shortness of breath. Instructed to elevate, limit salt, hydrate. She was in and out of multiple topics.

## 2018-05-03 ENCOUNTER — TELEPHONE (OUTPATIENT)
Dept: INTERNAL MEDICINE CLINIC | Age: 63
End: 2018-05-03

## 2018-05-03 NOTE — TELEPHONE ENCOUNTER
Pt called to let Jose Gshiv Akhil know she is stopping the rx Dexlansoprazole as her leg swelling wont go down and this is a side affect to the medication

## 2018-05-03 NOTE — TELEPHONE ENCOUNTER
Attempted to call patient multiple times today and unable to leave message as voicemail box full. She finally called back at 6:30 pm and spoke at length regarding swelling in her legs. Reiterated to her that she needed to wear compression hose and limit salt intake. She stated that she had started wearing the compression hose again last night and already noticed improvement. Talked at length regarding difficulty with her ex-, and regarding a recent arrest. She states that she has now been mandated by the  to attend alcohol rehab and court mandated mental health treatment. She states that she has her first appointment with the psychiatrist on Saturday. Discussed need to avoid drinking. Advised that refill for lisinopril sent to [place].

## 2018-05-08 ENCOUNTER — TELEPHONE (OUTPATIENT)
Dept: INTERNAL MEDICINE CLINIC | Age: 63
End: 2018-05-08

## 2018-05-08 NOTE — TELEPHONE ENCOUNTER
Called and spoke with patient. Updated me on current psychiatric care and AA meetings. Reports that she is seeing a counselor as directed by the court, but paying out of pocket. Reports improved swelling in her legs. Spoke for some time about her daily activities. Speech tangential and pressured, but can be redirected. Will call back with any issues.

## 2018-05-08 NOTE — TELEPHONE ENCOUNTER
Pt called stated tht she feels she is in a real crisis, she stated tht she is not suicidal but having memory problems, she is losing items and being able to find them, she hasn't been able to find any psychiatrists tht will see her, she said Fe Mathew turned her down, she needs to be seen because she has to go to court on 05/21/18 and needs to show she is trying to get help, she also said the swelling in her ankles have started to go down, [employer]

## 2018-05-14 ENCOUNTER — TELEPHONE (OUTPATIENT)
Dept: INTERNAL MEDICINE CLINIC | Age: 63
End: 2018-05-14

## 2018-05-14 NOTE — TELEPHONE ENCOUNTER
Pt calling asking to speak with nurse. Says she is having panic attacks and she needs to know what Dr. Kwabena Chiu wants her to do. Also wants nurse to tell her who is on call.

## 2018-05-15 NOTE — TELEPHONE ENCOUNTER
Pt. Returning call says she will have her ringer up when you call her back. Says she has a history of panic attacks for the past 30 years and used to get xanax for them.   Patient would like a call back     pls advise

## 2018-05-16 NOTE — TELEPHONE ENCOUNTER
Nicole Calixto 17 hours ago (3:07 PM)                 Pt called again- she has AA meeting until 4pm would like to talk with someone,.     She feels if no one wants to speak her, I advised  did try and reach out on the 14th at [address] [place] [address] with patient, she said she has a great counselor that has been court ordered, the counselor is at [facility] in [place], [place]. IThe counseling sessions  Have been court ordered because she was arrested, she was in a bar \"eating wedding cake and someone said I dare you to do this. I put food down the toilet\" She was rambling about random topics, her divorce, her house they built when they got , when her  got a restraining order, her mom passing. She was extremely hard to redirect to the reason of this call. She said her counselor cannot prescribe anything. Alvin if we can call this office and speak with the counselor she is seeing? She is all over the place.  None of the other providers will give her anything due to her history, etc. Please advise

## 2018-05-17 NOTE — TELEPHONE ENCOUNTER
Called patient and verified full name and date of birth. Patient states that she is now seeing someone from [facility]. for counseling, that is a , due to her being court appointed. Patient states she may have to change doctors due to Dr. Leta Felty always being on vacation. I then stated that Dr. Leta Felty is on vacation at this time but she is still doing some work from home as she usually does and that I will relay the message. She states that her court appointed counselor is Shayna Englishe is he [phone] ext [phone].

## 2018-05-17 NOTE — TELEPHONE ENCOUNTER
Pt has called back again inq about whether she will get any meds for the panic attacks, she is aware tht BS is out of the office

## 2018-05-17 NOTE — TELEPHONE ENCOUNTER
Discussed with Thelma Hamilotn yesterday. Patient should not receive benzodiazepines to manage panic attacks from our office. She has been having difficulty with alcohol abuse recently and has been ordered by the court to attend AA meetings. Will need to discuss with her court appointed counselor. She was also seeing a provider at [facility] at their [facility] location in [place] prior to this court appointed counselor (about 6 weeks ago).

## 2018-05-25 ENCOUNTER — TELEPHONE (OUTPATIENT)
Dept: INTERNAL MEDICINE CLINIC | Age: 63
End: 2018-05-25

## 2018-05-25 NOTE — TELEPHONE ENCOUNTER
Called and spoke with patient. Reports that she presented before the  on Monday, but the arresting officer did not appear so her case was dismissed. She states that she has decided to continue with AA as she has found it to be helpful and it is covered by [place] since she has met her deductible. She is also continuing to see the court appointed  since she has found her helpful, but she is trying to locate a new psychiatrist to address her bipolar disorder. She states that her foot swelling has improved. She has stopped taking Dexilant without recurrence of her reflux. She describes hand swelling, and it was discussed that she should continue to watch her salt intake. Will assess at visit next week. Reports malodorous vaginal discharge. Stating using condom for occasional encounters with single partner. Requests to have it evaluated at visit next week, given recent trichomonas infection. Post-Anesthesia Evaluation and Assessment    Patient: Dennise Shah MRN: [id number]  SSN: [id number]    YOB: 1927  Age: 80 y.o. Sex: male      Data from PACU flowsheet    Cardiovascular Function/Vital Signs  Visit Vitals    /55 (BP 1 Location: Right arm, BP Patient Position: At rest)    Pulse 66    Temp 36.4 °C (97.5 °F)    Resp 14    Ht 6' (1.829 m)    Wt 63.5 kg (140 lb)    SpO2 100%    BMI 18.99 kg/m2       Patient is status post anesthesia    Nausea/Vomiting: controlled    Postoperative hydration reviewed and adequate. Pain:  Managed    Neurological Status: At baseline    Mental Status and Level of Consciousness: Alert and oriented     Pulmonary Status:   Adequate oxygenation and airway patent    Complications related to anesthesia: None    Post-anesthesia assessment completed.  No concerns    Signed By: Alexa Nicholson CRNA     February 13, 2017

## 2018-05-25 NOTE — TELEPHONE ENCOUNTER
Pt called hands swelling and she cant get her rings off, ankles were swelling but now better    Pt is asking for something called into pharmacy    I did make appt for her to see Bevreley Vasques 05/29

## 2018-05-30 ENCOUNTER — TELEPHONE (OUTPATIENT)
Dept: INTERNAL MEDICINE CLINIC | Age: 63
End: 2018-05-30

## 2018-05-30 NOTE — TELEPHONE ENCOUNTER
Would recommend [place] Turpin at [address] (next to [employer]); Phone # [phone]. Please let her know.

## 2018-05-30 NOTE — TELEPHONE ENCOUNTER
Pt wants to know if you can recommend a DR in the [place] area- she has been trying to get here on the bus and the closest she could get was [place] on [address]- says she can't walk here and it's too difficult to get here.  Please advise

## 2018-07-18 ENCOUNTER — OFFICE VISIT (OUTPATIENT)
Dept: INTERNAL MEDICINE CLINIC | Age: 63
End: 2018-07-18

## 2018-07-18 VITALS
BODY MASS INDEX: 22.53 KG/M2 | SYSTOLIC BLOOD PRESSURE: 131 MMHG | HEIGHT: 66 IN | HEART RATE: 79 BPM | DIASTOLIC BLOOD PRESSURE: 82 MMHG | OXYGEN SATURATION: 100 % | TEMPERATURE: 95.1 F | RESPIRATION RATE: 18 BRPM | WEIGHT: 140.2 LBS

## 2018-07-18 DIAGNOSIS — I10 BENIGN HYPERTENSION WITHOUT CHF: ICD-10-CM

## 2018-07-18 DIAGNOSIS — E55.9 VITAMIN D DEFICIENCY: ICD-10-CM

## 2018-07-18 DIAGNOSIS — M79.645 THUMB PAIN, LEFT: Primary | ICD-10-CM

## 2018-07-18 DIAGNOSIS — R73.01 ELEVATED FASTING GLUCOSE: ICD-10-CM

## 2018-07-18 DIAGNOSIS — F31.9 BIPOLAR AFFECTIVE DISORDER, REMISSION STATUS UNSPECIFIED (HCC): ICD-10-CM

## 2018-07-18 DIAGNOSIS — E78.5 DYSLIPIDEMIA: ICD-10-CM

## 2018-07-18 RX ORDER — DIAZEPAM 10 MG/1
TABLET ORAL
Refills: 0 | COMMUNITY
Start: 2018-07-06 | End: 2018-10-17
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
## 2018-07-18 NOTE — PATIENT INSTRUCTIONS
1) follow-up in 4 months or sooner if worsening symptoms. 2) if you don't hear about your appt. With orthopedics in 1 week, give them a call. 3) use heating pad, icy/ hot, tiger balm for pain. 4) Make appt. With psychiatry ASAP. 5) stop the chromium Trigger Finger: Care Instructions Your Care Instructions A trigger finger is a finger stuck in a bent position. The bent finger usually straightens out on its own. A trigger finger can be painful, but it normally is not a serious problem. Trigger fingers seem to occur more in some groups of people. These include people who have diabetes or arthritis or who have injured their hands in the past. This problem also occurs in musicians and people who  tools often. Rest, exercises, and other things you can do at home may help your trigger finger relax so that it can bend as it should. You may get a corticosteroid shot. This can reduce swelling and pain. Your doctor may put a splint on your finger. This will give your finger some rest and avoid irritating the joint. You may need surgery if the finger keeps locking in a bent position. Follow-up care is a key part of your treatment and safety. Be sure to make and go to all appointments, and call your doctor if you are having problems. It's also a good idea to know your test results and keep a list of the medicines you take. How can you care for yourself at home? · If your doctor put a splint on your finger, wear the splint as directed. Do not remove it until your doctor says you can. · You may need to change your activities to avoid movements that irritate the finger. · If your finger is swollen, put ice or a cold pack on your finger for 10 to 20 minutes at a time. Try to do this every 1 to 2 hours for the next 3 days (when you are awake) or until the swelling goes down. Put a thin cloth between the ice and your skin.  
· Prop up your hand on a pillow when you ice it or anytime you sit or lie down during the next 3 days. Try to keep it above the level of your heart. This will help reduce swelling. · Take your medicines exactly as prescribed. Call your doctor if you think you are having a problem with your medicine. · Ask your doctor if you can take an over-the-counter pain medicine, such as acetaminophen (Tylenol), ibuprofen (Advil, Motrin), or naproxen (Aleve). Be safe with medicines. Read and follow all instructions on the label. · If your doctor recommends exercises, do them as directed. When should you call for help? Call your doctor now or seek immediate medical care if: 
  · Your finger locks in a bent position and will not straighten.  
 Watch closely for changes in your health, and be sure to contact your doctor if: 
  · You do not get better as expected. Where can you learn more? Go to http://kasey-yamilet.info/. Enter M826 in the search box to learn more about \"Trigger Finger: Care Instructions. \" Current as of: November 29, 2017 Content Version: 11.7 © 9747-7190 [employer]. Care instructions adapted under license by [employer] (which disclaims liability or warranty for this information). If you have questions about a medical condition or this instruction, always ask your healthcare professional. [address] any warranty or liability for your use of this information.

## 2018-07-18 NOTE — PROGRESS NOTES
ROOM # 1    Negrita Sifuentes presents today for   Chief Complaint   Patient presents with    [facility] Care     new patient    Medication Refill       Negrita Sifuentes preferred language for health care discussion is english/other. Is someone accompanying this pt? no    Is the patient using any DME equipment during OV? no    Depression Screening:  PHQ over the last two weeks 7/18/2018 2/22/2018 1/18/2018 1/30/2014   Little interest or pleasure in doing things Several days Nearly every day Nearly every day Not at all   Feeling down, depressed, irritable, or hopeless Several days Nearly every day Nearly every day Not at all   Total Score PHQ 2 2 6 6 0       Learning Assessment:  Learning Assessment 7/18/2018 1/30/2014   PRIMARY LEARNER Patient Patient   HIGHEST LEVEL OF EDUCATION - PRIMARY LEARNER  4 YEARS OF COLLEGE -   BARRIERS PRIMARY LEARNER NONE -   [address] CAREGIVER No -   PRIMARY LANGUAGE ENGLISH ENGLISH   LEARNER PREFERENCE PRIMARY READING READING   ANSWERED BY patient patient   RELATIONSHIP SELF SELF       Abuse Screening:  Abuse Screening Questionnaire 7/18/2018   Do you ever feel afraid of your partner? N   Are you in a relationship with someone who physically or mentally threatens you? N   Is it safe for you to go home? Y       Fall Risk  No flowsheet data found. Health Maintenance reviewed and discussed per provider. Yes    Negrita Sifuentes is due for   Health Maintenance Due   Topic Date Due    PAP AKA CERVICAL CYTOLOGY  09/14/2018   Recent pap-record request- 2016 [facility] for women  Please order/place referral if appropriate. Advance Directive:  1. Do you have an advance directive in place? Patient Reply: no    2. If not, would you like material regarding how to put one in place? Patient Reply: no    Coordination of Care:  1. Have you been to the ER, urgent care clinic since your last visit? Hospitalized since your last visit? no    2.  Have you seen or consulted any other health care providers outside of the [address] [place] [address] since your last visit? Include any pap smears or colon screening.  no

## 2018-07-18 NOTE — MR AVS SNAPSHOT
[address] [place] [address] 
 
 
 [facility] [address] [facility] [address] [address] 
[phone] Patient: Spike Martini MRN: [id number] :1955 Visit Information Date & Time Provider Department Dept. Phone Encounter #  
 2018  9:30 AM Gt Mcbride MD [address] [phone] [id number] Upcoming Health Maintenance Date Due  
 PAP AKA CERVICAL CYTOLOGY 2018 Influenza Age 5 to Adult 2018 Pneumococcal 19-64 Highest Risk (2 of 3 - PCV13) 2019 BREAST CANCER SCRN MAMMOGRAM 3/15/2019 COLONOSCOPY 2019 DTaP/Tdap/Td series (3 - Td) 2028 Allergies as of 2018  Review Complete On: 2018 By: Gt Mcbride MD  
  
 Severity Noted Reaction Type Reactions Omeprazole Medium 2018    Nausea and Vomiting Codeine    Nausea and Vomiting Macrobid [Nitrofurantoin Monohyd/m-cryst]  2015    Nausea and Vomiting Current Immunizations  Reviewed on 2018 Name Date Influenza Vaccine (Quad) PF 2018 12:16 PM  
 PPD 2011 Pneumococcal Polysaccharide (PPSV-23) 2018 12:00 AM  
 TD Vaccine 2007 Td 2007 Tdap 2018 12:00 AM, 2017, 2015 Zoster Vaccine, Live 2016  3:20 PM  
  
 Not reviewed this visit You Were Diagnosed With   
  
 Codes Comments Thumb pain, left    -  Primary ICD-10-CM: W78.463 ICD-9-CM: 729.5 Bipolar affective disorder, remission status unspecified ([facility].)     ICD-10-CM: F31.9 ICD-9-CM: 296.80 Benign hypertension without CHF     ICD-10-CM: I10 
ICD-9-CM: 401.1 Dyslipidemia     ICD-10-CM: E78.5 ICD-9-CM: 272.4 Vitamin D deficiency     ICD-10-CM: E55.9 ICD-9-CM: 268.9 Elevated fasting glucose     ICD-10-CM: R73.01 
ICD-9-CM: 790.21 Vitals BP Pulse Temp Resp Height(growth percentile) Weight(growth percentile)  131/82 (BP 1 Location: Left arm, BP Patient Position: Sitting) 79 95.1 °F (35.1 °C) (Oral) 18 5' 5.5\" (1.664 m) 140 lb 3.2 oz (63.6 kg) SpO2 BMI OB Status Smoking Status 100% 22.98 kg/m2 Postmenopausal Never Smoker Vitals History BMI and BSA Data Body Mass Index Body Surface Area  
 22.98 kg/m 2 1.71 m 2 Preferred Pharmacy Pharmacy Name Phone [facility] [address] [facility], [address] [place] [address] [phone] Your Updated Medication List  
  
   
This list is accurate as of 7/18/18 10:08 AM.  Always use your most recent med list.  
  
  
  
  
 chromium-herb no.238 250-775 mcg-mg Tab Take  by mouth daily. diazePAM 10 mg tablet Commonly known as:  VALIUM TK ONE T PO TID PRN  
  
 lisinopril 2.5 mg tablet Commonly known as:  Rima Shames Take 1 Tab by mouth daily. pnv w/o calcium-iron fum-fa 27-1 mg Tab Take  by mouth. Take one po daily We Performed the Following REFERRAL TO ORTHOPEDICS [QVE415 Custom] Comments:  
 Please evaluate for left thumb pain and possible trigger finger at the joint in 1 week. REFERRAL TO PSYCHIATRY [REF91 Custom] Comments:  
 Please evaluate for bipolar disorder in 1 week. Pt will make own appt. To-Do List   
 07/18/2018 Lab:  CBC W/O DIFF   
  
 07/18/2018 Lab:  HEMOGLOBIN A1C WITH EAG   
  
 07/18/2018 Lab:  LIPID PANEL   
  
 07/18/2018 Lab:  METABOLIC PANEL, COMPREHENSIVE   
  
 07/18/2018 Lab:  URINALYSIS W/ RFLX MICROSCOPIC   
  
 07/18/2018 Lab:  VITAMIN D, 25 HYDROXY   
  
 07/18/2018 Imaging:  XR THUMB LT MIN 2 V Referral Information Referral ID Referred By Referred To  
  
 [id number] Keeley Toure MD   
   [facility] [facility] [address] [facility] Phone: [phone] Fax: [phone] Visits Status Start Date End Date 1 New Request 7/18/18 7/18/19  If your referral has a status of pending review or denied, additional information will be sent to support the outcome of this decision. Referral ID Referred By Referred To  
 [id number] [facility] [address] Phone: [phone] Fax: [phone] Visits Status Start Date End Date 1 New Request 7/18/18 7/18/19 If your referral has a status of pending review or denied, additional information will be sent to support the outcome of this decision. Patient Instructions 1) follow-up in 3 months or sooner if worsening symptoms. 2) if you don't hear about your appt. With orthopedics in 1 week, give them a call. 3) use heating pad, icy/ hot, tiger balm for pain. 4) Make appt. With psychiatry ASAP. Trigger Finger: Care Instructions Your Care Instructions A trigger finger is a finger stuck in a bent position. The bent finger usually straightens out on its own. A trigger finger can be painful, but it normally is not a serious problem. Trigger fingers seem to occur more in some groups of people. These include people who have diabetes or arthritis or who have injured their hands in the past. This problem also occurs in musicians and people who  tools often. Rest, exercises, and other things you can do at home may help your trigger finger relax so that it can bend as it should. You may get a corticosteroid shot. This can reduce swelling and pain. Your doctor may put a splint on your finger. This will give your finger some rest and avoid irritating the joint. You may need surgery if the finger keeps locking in a bent position. Follow-up care is a key part of your treatment and safety. Be sure to make and go to all appointments, and call your doctor if you are having problems. It's also a good idea to know your test results and keep a list of the medicines you take. How can you care for yourself at home? · If your doctor put a splint on your finger, wear the splint as directed. Do not remove it until your doctor says you can. · You may need to change your activities to avoid movements that irritate the finger. · If your finger is swollen, put ice or a cold pack on your finger for 10 to 20 minutes at a time. Try to do this every 1 to 2 hours for the next 3 days (when you are awake) or until the swelling goes down. Put a thin cloth between the ice and your skin. · Prop up your hand on a pillow when you ice it or anytime you sit or lie down during the next 3 days. Try to keep it above the level of your heart. This will help reduce swelling. · Take your medicines exactly as prescribed. Call your doctor if you think you are having a problem with your medicine. · Ask your doctor if you can take an over-the-counter pain medicine, such as acetaminophen (Tylenol), ibuprofen (Advil, Motrin), or naproxen (Aleve). Be safe with medicines. Read and follow all instructions on the label. · If your doctor recommends exercises, do them as directed. When should you call for help? Call your doctor now or seek immediate medical care if: 
  · Your finger locks in a bent position and will not straighten.  
 Watch closely for changes in your health, and be sure to contact your doctor if: 
  · You do not get better as expected. Where can you learn more? Go to http://kasey-yamilet.info/. Enter M826 in the search box to learn more about \"Trigger Finger: Care Instructions. \" Current as of: November 29, 2017 Content Version: 11.7 © 3552-4482 [employer]. Care instructions adapted under license by [employer] (which disclaims liability or warranty for this information). If you have questions about a medical condition or this instruction, always ask your healthcare professional. [address] any warranty or liability for your use of this information. Introducing [facility] [place] [facility]! [employer] introduces [employer] patient portal. Now you can access parts of your medical record, email your doctor's office, and request medication refills online. 1. In your internet browser, go to [email or web address] 2. Click on the First Time User? Click Here link in the Sign In box. You will see the New Member Sign Up page. 3. Enter your [employer]S Access Code exactly as it appears below. You will not need to use this code after youve completed the sign-up process. If you do not sign up before the expiration date, you must request a new code. · [employer] Access Code: [email or web address] Expires: 8/27/2018  1:35 PM 
 
4. Enter the last four digits of your Social Security Number (xxxx) and Date of Birth (mm/dd/yyyy) as indicated and click Submit. You will be taken to the next sign-up page. 5. Create a CBIT [employer]/S ID. This will be your [employer]/S login ID and cannot be changed, so think of one that is secure and easy to remember. 6. Create a [employer]S password. You can change your password at any time. 7. Enter your Password Reset Question and Answer. This can be used at a later time if you forget your password. 8. Enter your e-mail address. You will receive e-mail notification when new information is available in [address] 9. Click Sign Up. You can now view and download portions of your medical record. 10. Click the Download Summary menu link to download a portable copy of your medical information. If you have questions, please visit the Frequently Asked Questions section of the [employer]BIT [employer]S website. Remember, [employer] is NOT to be used for urgent needs. For medical emergencies, dial 911. Now available from your iPhone and Android! Please provide this summary of care documentation to your next provider. Your primary care clinician is listed as Christi Mckeon. If you have any questions after today's visit, please call [phone].

## 2018-07-19 LAB
25(OH)D3 SERPL-MCNC: 37.9 NG/ML (ref 32–100)
A-G RATIO,AGRAT: 1.6 RATIO (ref 1.1–2.6)
ALBUMIN SERPL-MCNC: 4.5 G/DL (ref 3.5–5)
ALP SERPL-CCNC: 56 U/L (ref 40–120)
ALT SERPL-CCNC: 18 U/L (ref 5–40)
ANION GAP SERPL CALC-SCNC: 14 MMOL/L
AST SERPL W P-5'-P-CCNC: 18 U/L (ref 10–37)
AVG GLU, 10930: 108 MG/DL (ref 91–123)
BILIRUB SERPL-MCNC: 0.3 MG/DL (ref 0.2–1.2)
BILIRUB UR QL: NEGATIVE
BUN SERPL-MCNC: 12 MG/DL (ref 6–22)
CALCIUM SERPL-MCNC: 10.1 MG/DL (ref 8.4–10.5)
CHLORIDE SERPL-SCNC: 100 MMOL/L (ref 98–110)
CHOLEST SERPL-MCNC: 229 MG/DL (ref 110–200)
CO2 SERPL-SCNC: 28 MMOL/L (ref 20–32)
CREAT SERPL-MCNC: 0.7 MG/DL (ref 0.8–1.4)
ERYTHROCYTE [DISTWIDTH] IN BLOOD BY AUTOMATED COUNT: 18.2 % (ref 10–15.5)
GFRAA, 66117: >60
GFRNA, 66118: >60
GLOBULIN,GLOB: 2.8 G/DL (ref 2–4)
GLUCOSE SERPL-MCNC: 72 MG/DL (ref 70–99)
GLUCOSE UR QL: NEGATIVE MG/DL
HBA1C MFR BLD HPLC: 5.4 % (ref 4.8–5.9)
HCT VFR BLD AUTO: 40 % (ref 35.1–48)
HDLC SERPL-MCNC: 116 MG/DL (ref 40–59)
HDLC SERPL-MCNC: 2 MG/DL (ref 0–5)
HGB BLD-MCNC: 11.9 G/DL (ref 11.7–16)
HGB UR QL STRIP: NEGATIVE
KETONES UR QL STRIP.AUTO: NEGATIVE MG/DL
LDLC SERPL CALC-MCNC: 100 MG/DL (ref 50–99)
LEUKOCYTE ESTERASE: NEGATIVE
MCH RBC QN AUTO: 27 PG (ref 26–34)
MCHC RBC AUTO-ENTMCNC: 30 G/DL (ref 31–36)
MCV RBC AUTO: 89 FL (ref 80–95)
NITRITE UR QL STRIP.AUTO: NEGATIVE
PH UR STRIP: 5 PH (ref 5–8)
PLATELET # BLD AUTO: 366 K/UL (ref 140–440)
PMV BLD AUTO: 11.6 FL (ref 9–13)
POTASSIUM SERPL-SCNC: 4.6 MMOL/L (ref 3.5–5.5)
PROT SERPL-MCNC: 7.3 G/DL (ref 6.2–8.1)
PROT UR QL STRIP: NEGATIVE MG/DL
RBC # BLD AUTO: 4.48 M/UL (ref 3.8–5.2)
SODIUM SERPL-SCNC: 142 MMOL/L (ref 133–145)
SP GR UR: 1.02 (ref 1–1.03)
TRIGL SERPL-MCNC: 65 MG/DL (ref 40–149)
UROBILINOGEN UR STRIP-MCNC: <2 MG/DL
VLDLC SERPL CALC-MCNC: 13 MG/DL (ref 8–30)
WBC # BLD AUTO: 6.7 K/UL (ref 4–11)

## 2018-08-13 ENCOUNTER — TELEPHONE (OUTPATIENT)
Dept: INTERNAL MEDICINE CLINIC | Age: 63
End: 2018-08-13

## 2018-08-13 NOTE — TELEPHONE ENCOUNTER
Attempted to contact pt at  number, no answer. Mailbox is full and cannot receive any messages. Will continue to try to contact pt.      Please inform pt that she received Zoster Vaccine 05/13/2016

## 2018-08-15 NOTE — TELEPHONE ENCOUNTER
Pt contacted at home number. 2 pt identifiers confirmed. Pt informed that she received Shingles Vaccine on 05/13/2016. No other questions or concerns at this time.

## 2018-09-07 ENCOUNTER — OFFICE VISIT (OUTPATIENT)
Dept: INTERNAL MEDICINE CLINIC | Age: 63
End: 2018-09-07

## 2018-09-07 VITALS
BODY MASS INDEX: 22.02 KG/M2 | HEIGHT: 66 IN | SYSTOLIC BLOOD PRESSURE: 139 MMHG | OXYGEN SATURATION: 98 % | DIASTOLIC BLOOD PRESSURE: 91 MMHG | HEART RATE: 82 BPM | WEIGHT: 137 LBS | TEMPERATURE: 97.5 F | RESPIRATION RATE: 17 BRPM

## 2018-09-07 DIAGNOSIS — N30.01 ACUTE CYSTITIS WITH HEMATURIA: Primary | ICD-10-CM

## 2018-09-07 DIAGNOSIS — R35.0 URINARY FREQUENCY: ICD-10-CM

## 2018-09-07 LAB
BILIRUB UR QL STRIP: NEGATIVE
GLUCOSE UR-MCNC: NEGATIVE MG/DL
KETONES P FAST UR STRIP-MCNC: NEGATIVE MG/DL
PH UR STRIP: 6.5 [PH] (ref 4.6–8)
PROT UR QL STRIP: NORMAL
SP GR UR STRIP: 1.02 (ref 1–1.03)
UA UROBILINOGEN AMB POC: NORMAL (ref 0.2–1)
URINALYSIS CLARITY POC: CLEAR
URINALYSIS COLOR POC: YELLOW
URINE BLOOD POC: NORMAL
URINE LEUKOCYTES POC: NORMAL
URINE NITRITES POC: NEGATIVE

## 2018-09-07 RX ORDER — AMOXICILLIN AND CLAVULANATE POTASSIUM 875; 125 MG/1; MG/1
1 TABLET, FILM COATED ORAL EVERY 12 HOURS
Qty: 10 TAB | Refills: 0 | Status: SHIPPED | OUTPATIENT
Start: 2018-09-07 | End: 2018-09-12

## 2018-09-07 NOTE — PATIENT INSTRUCTIONS
Frequent Urination: Care Instructions  Your Care Instructions  An urge to urinate frequently but usually passing only small amounts of urine is a common symptom of urinary problems, such as urinary tract infections. The bladder may become inflamed. This can cause the urge to urinate. You may try to urinate more often than usual to try to soothe that urge. Frequent urination also may be caused by sexually transmitted infections (STIs) or kidney stones. Or it may happen when something irritates the tube that carries urine from the bladder to the outside of the body (urethra). It may also be a sign of diabetes. The cause may be hard to find. You may need tests. Follow-up care is a key part of your treatment and safety. Be sure to make and go to all appointments, and call your doctor if you are having problems. It's also a good idea to know your test results and keep a list of the medicines you take. How can you care for yourself at home? · Drink extra water for the next day or two. This will help make the urine less concentrated. (If you have kidney, heart, or liver disease and have to limit fluids, talk with your doctor before you increase the amount of fluids you drink.)  · Avoid drinks that are carbonated or have caffeine. They can irritate the bladder. For women:  · Urinate right after you have sex. · After you go to the bathroom, wipe from front to back. · Avoid douches, bubble baths, and feminine hygiene sprays. And avoid other feminine hygiene products that have deodorants. When should you call for help? Call your doctor now or seek immediate medical care if:    · You have new symptoms, such as fever, nausea, or vomiting.     · You have new or worse symptoms of a urinary problem. For example:  ¨ You have blood or pus in your urine. ¨ You have chills or body aches. ¨ It hurts to urinate. ¨ You have groin or belly pain. ¨ You have pain in your back just below your rib cage (the flank area).  Watch closely for changes in your health, and be sure to contact your doctor if you feel thirstier than usual.  Where can you learn more? Go to http://kasey-yamilet.info/. Enter 201 3176 in the search box to learn more about \"Frequent Urination: Care Instructions. \"  Current as of: May 12, 2017  Content Version: 11.7  © 6307-1169 [employer]. Care instructions adapted under license by [employer] (which disclaims liability or warranty for this information). If you have questions about a medical condition or this instruction, always ask your healthcare professional. [place] [address] any warranty or liability for your use of this information.

## 2018-09-07 NOTE — ACP (ADVANCE CARE PLANNING)
Advance Directive:  1. Do youve an advance directive in place? Patient Reply: NO    2. If not, would you like material regarding how to put one in place?   NO

## 2018-09-07 NOTE — PROGRESS NOTES
HISTORY OF PRESENT ILLNESS  Zachary Meade is a 58 y.o. female. Urinary Frequency    The history is provided by the patient. This is a new problem. The current episode started more than 1 week ago. The problem occurs every urination. The problem has not changed since onset. The quality of the pain is described as burning. The pain is at a severity of 2/10. The pain is mild. There has been no fever. She is sexually active. Associated symptoms include frequency, hematuria, hesitancy and urgency. Pertinent negatives include no chills, no sweats, no nausea, no vomiting, no discharge, no flank pain, no vaginal discharge, no abdominal pain and no back pain. The patient is not pregnant. She has tried nothing for the symptoms. The treatment provided no relief. Her past medical history does not include kidney stones, single kidney or recurrent UTIs. Review of Systems   Constitutional: Negative for chills, fever and malaise/fatigue. Respiratory: Negative for shortness of breath. Cardiovascular: Negative for chest pain and palpitations. Gastrointestinal: Negative for abdominal pain, constipation, diarrhea, heartburn, nausea and vomiting. Genitourinary: Positive for dysuria, frequency, hematuria, hesitancy and urgency. Negative for flank pain and vaginal discharge. Musculoskeletal: Negative for back pain and myalgias. Neurological: Negative for dizziness and headaches. Psychiatric/Behavioral: The patient is not nervous/anxious. Physical Exam   Constitutional: She is oriented to person, place, and time. She appears well-developed and well-nourished. HENT:   Head: Normocephalic. Eyes: Pupils are equal, round, and reactive to light. Cardiovascular: Regular rhythm. Pulmonary/Chest: Breath sounds normal.   Abdominal: Soft. Bowel sounds are normal. She exhibits no distension. There is no tenderness. There is no rebound and no CVA tenderness.    Neurological: She is alert and oriented to person, place, and time. No cranial nerve deficit. Skin: Skin is dry. Psychiatric: She has a normal mood and affect. Nursing note and vitals reviewed. ASSESSMENT and PLAN    ICD-10-CM ICD-9-CM    1. Acute cystitis with hematuria N30.01 595.0 amoxicillin-clavulanate (AUGMENTIN) 875-125 mg per tablet   2.  Urinary frequency R35.0 788.41 AMB POC URINALYSIS DIP STICK AUTO W/O MICRO      amoxicillin-clavulanate (AUGMENTIN) 875-125 mg per tablet

## 2018-09-07 NOTE — PROGRESS NOTES
ROOM # 1Identified pt with two pt identifiers(name and ). Reviewed record in preparation for visit and have obtained necessary documentation. Chief Complaint   Patient presents with    Urinary Frequency      Hubert Zuluaga preferred language for health care discussion is english/other. Is the patient using any DME equipment during OV? Gissell Zarco is due for:  Health Maintenance Due   Topic    Influenza Age 5 to Adult      Health Maintenance reviewed and discussed per provider  Please order/place referral if appropriate. Advance Directive:  1. Do youve an advance directive in place? Patient Reply: NO    2. If not, would you like material regarding how to put one in place? NO     Coordination of Care:  1. Have you been to the ER, urgent care clinic since your last visit? Hospitalized since your last visit? NO    2. Have you seen or consulted any other health care providers outside of the [address] [place] [address] since your last visit? Include any pap smears or colon screening. NO    Patient is accompanied by self I have received verbal consent from Hubert Zuluaga to discuss any/all medical information while they are present in the room.     Learning Assessment:  Learning Assessment 2018   PRIMARY LEARNER Patient Patient   HIGHEST LEVEL OF EDUCATION - PRIMARY LEARNER  4 YEARS OF COLLEGE -   BARRIERS PRIMARY LEARNER NONE -   CO-LEARNER CAREGIVER No -   PRIMARY LANGUAGE ENGLISH ENGLISH   LEARNER PREFERENCE PRIMARY READING READING   ANSWERED BY patient patient   RELATIONSHIP SELF SELF     Depression Screening:  PHQ over the last two weeks 2018   Little interest or pleasure in doing things Several days Nearly every day Nearly every day Not at all   Feeling down, depressed, irritable, or hopeless Several days Nearly every day Nearly every day Not at all   Total Score PHQ 2 2 6 6 0     Abuse Screening:  Abuse Screening Questionnaire 2018   Do you ever feel afraid of your partner? N   Are you in a relationship with someone who physically or mentally threatens you? N   Is it safe for you to go home?  Y     Fall Risk  n/i

## 2018-09-07 NOTE — MR AVS SNAPSHOT
[facility] 
 
 
 [address] [address] 
[phone] Patient: Negrita Sifuentes MRN: [id number] :1955 Visit Information Date & Time Provider Department Dept. Phone Encounter #  
 2018  2:45 PM Maksim Blas NP [facility] [phone] [id number] Your Appointments 10/31/2018  3:15 PM  
Office Visit with Charli Chavez MD  
[facility]) Appt Note: pre op clearance left thumb trigger release 18  [phone]  
 [address]  
  
   
 [address] [facility] Upcoming Health Maintenance Date Due Influenza Age 5 to Adult 2018 Pneumococcal 19-64 Highest Risk (2 of 3 - PCV13) 2019 BREAST CANCER SCRN MAMMOGRAM 3/15/2019 COLONOSCOPY 2019 PAP AKA CERVICAL CYTOLOGY 7/10/2021 DTaP/Tdap/Td series (3 - Td) 2028 Allergies as of 2018  Review Complete On: 2018 By: Neida Flores Severity Noted Reaction Type Reactions Omeprazole Medium 2018    Nausea and Vomiting Codeine    Nausea and Vomiting Macrobid [Nitrofurantoin Monohyd/m-cryst]  2015    Nausea and Vomiting Current Immunizations  Reviewed on 2018 Name Date Influenza Vaccine (Quad) PF 2018 12:16 PM  
 PPD 2011 Pneumococcal Polysaccharide (PPSV-23) 2018 12:00 AM  
 TD Vaccine 2007 Td 2007 Tdap 2018 12:00 AM, 2017, 2015 Zoster Vaccine, Live 2016  3:20 PM  
  
 Not reviewed this visit You Were Diagnosed With   
  
 Codes Comments Acute cystitis with hematuria    -  Primary ICD-10-CM: N30.01 
ICD-9-CM: 595.0 Urinary frequency     ICD-10-CM: R35.0 ICD-9-CM: 788.41 Vitals BP Pulse Temp Resp Height(growth percentile) Weight(growth percentile) Marie Qiu ) 142/94 (BP 1 Location: Right arm, BP Patient Position: Sitting) 82 97.5 °F (36.4 °C) (Oral) 17 5' 5.5\" (1.664 m) 137 lb (62.1 kg) SpO2 BMI OB Status Smoking Status 98% 22.45 kg/m2 Postmenopausal Never Smoker Vitals History BMI and BSA Data Body Mass Index Body Surface Area  
 22.45 kg/m 2 1.69 m 2 Preferred Pharmacy Pharmacy Name Phone [facility] [address] [phone] Your Updated Medication List  
  
   
This list is accurate as of 9/7/18  3:21 PM.  Always use your most recent med list.  
  
  
  
  
 amoxicillin-clavulanate 875-125 mg per tablet Commonly known as:  AUGMENTIN Take 1 Tab by mouth every twelve (12) hours for 5 days. chromium-herb no.238 250-775 mcg-mg Tab Take  by mouth daily. diazePAM 10 mg tablet Commonly known as:  VALIUM Take 1/2-1 po tid prn anxiety  
  
 lisinopril 2.5 mg tablet Commonly known as:  Mai [place] Take 1 Tab by mouth daily. pnv w/o calcium-iron fum-fa 27-1 mg Tab Take  by mouth. Take one po daily Prescriptions Sent to Pharmacy Refills  
 amoxicillin-clavulanate (AUGMENTIN) 875-125 mg per tablet 0 Sig: Take 1 Tab by mouth every twelve (12) hours for 5 days. Class: Normal  
 Pharmacy: [facility] [address] [place] [address], [address] [place] [address] Ph #: [phone] Route: Oral  
  
We Performed the Following AMB POC URINALYSIS DIP STICK AUTO W/O MICRO [23551 CPT(R)] Patient Instructions Frequent Urination: Care Instructions Your Care Instructions An urge to urinate frequently but usually passing only small amounts of urine is a common symptom of urinary problems, such as urinary tract infections. The bladder may become inflamed. This can cause the urge to urinate. You may try to urinate more often than usual to try to soothe that urge. Frequent urination also may be caused by sexually transmitted infections (STIs) or kidney stones. Or it may happen when something irritates the tube that carries urine from the bladder to the outside of the body (urethra). It may also be a sign of diabetes. The cause may be hard to find. You may need tests. Follow-up care is a key part of your treatment and safety. Be sure to make and go to all appointments, and call your doctor if you are having problems. It's also a good idea to know your test results and keep a list of the medicines you take. How can you care for yourself at home? · Drink extra water for the next day or two. This will help make the urine less concentrated. (If you have kidney, heart, or liver disease and have to limit fluids, talk with your doctor before you increase the amount of fluids you drink.) · Avoid drinks that are carbonated or have caffeine. They can irritate the bladder. For women: · Urinate right after you have sex. · After you go to the bathroom, wipe from front to back. · Avoid douches, bubble baths, and feminine hygiene sprays. And avoid other feminine hygiene products that have deodorants. When should you call for help? Call your doctor now or seek immediate medical care if: 
  · You have new symptoms, such as fever, nausea, or vomiting.  
  · You have new or worse symptoms of a urinary problem. For example: ¨ You have blood or pus in your urine. ¨ You have chills or body aches. ¨ It hurts to urinate. ¨ You have groin or belly pain. ¨ You have pain in your back just below your rib cage (the flank area).  
 Watch closely for changes in your health, and be sure to contact your doctor if you feel thirstier than usual. 
Where can you learn more? Go to http://kasey-yamilet.info/. Enter 106 [id number] in the search box to learn more about \"Frequent Urination: Care Instructions. \" Current as of: May 12, 2017 Content Version: 11.7 © 3256-7991 Healthwise, Incorporated. Care instructions adapted under license by [employer] (which disclaims liability or warranty for this information). If you have questions about a medical condition or this instruction, always ask your healthcare professional. [address] any warranty or liability for your use of this information. Introducing [facility]! [facility] introduces N[employer]ilizer patient portal. Now you can access parts of your medical record, email your doctor's office, and request medication refills online. 1. In your internet browser, go to [email or web address] 2. Click on the First Time User? Click Here link in the Sign In box. You will see the New Member Sign Up page. 3. Enter your N[employer]izer Access Code exactly as it appears below. You will not need to use this code after youve completed the sign-up process. If you do not sign up before the expiration date, you must request a new code. · N[employer] Access Code: [email or web address] Expires: 12/6/2018  3:21 PM 
 
4. Enter the last four digits of your Social Security Number (xxxx) and Date of Birth (mm/dd/yyyy) as indicated and click Submit. You will be taken to the next sign-up page. 5. Create a Noxilizer ID. This will be your N[employer]ilizer login ID and cannot be changed, so think of one that is secure and easy to remember. 6. Create a N[employer]ilizer password. You can change your password at any time. 7. Enter your Password Reset Question and Answer. This can be used at a later time if you forget your password. 8. Enter your e-mail address. You will receive e-mail notification when new information is available in [address] 9. Click Sign Up. You can now view and download portions of your medical record. 10. Click the Download Summary menu link to download a portable copy of your medical information.  
 
If you have questions, please visit the Frequently Asked Questions section of the [employer]. Remember, [employer] is NOT to be used for urgent needs. For medical emergencies, dial 911. Now available from your iPhone and Android! Please provide this summary of care documentation to your next provider. Your primary care clinician is listed as Christi Mckeon. If you have any questions after today's visit, please call [phone].

## 2018-10-08 ENCOUNTER — TELEPHONE (OUTPATIENT)
Dept: INTERNAL MEDICINE CLINIC | Age: 63
End: 2018-10-08

## 2018-10-08 NOTE — TELEPHONE ENCOUNTER
Patient received the flu shot in Jan 2018, patient would like to know if she needs to get another flu shot.

## 2018-10-10 NOTE — TELEPHONE ENCOUNTER
Called pt 2 pt identifiers confirmed informed pt that it is a new flu vaccine this season so she will need to be re-vaccinated with this season's flu vaccine. Pt stated understanding and that she will walk in for the vaccine, stated understanding no other questions or concerns noted at this time.

## 2018-10-17 ENCOUNTER — TELEPHONE (OUTPATIENT)
Dept: INTERNAL MEDICINE CLINIC | Age: 63
End: 2018-10-17

## 2018-10-17 ENCOUNTER — HOSPITAL ENCOUNTER (EMERGENCY)
Age: 63
Discharge: HOME | End: 2018-10-17
Attending: EMERGENCY MEDICINE
Payer: COMMERCIAL

## 2018-10-17 ENCOUNTER — OFFICE VISIT (OUTPATIENT)
Dept: INTERNAL MEDICINE CLINIC | Age: 63
End: 2018-10-17

## 2018-10-17 VITALS
BODY MASS INDEX: 23.06 KG/M2 | HEART RATE: 101 BPM | DIASTOLIC BLOOD PRESSURE: 62 MMHG | WEIGHT: 138.4 LBS | HEIGHT: 65 IN | RESPIRATION RATE: 16 BRPM | OXYGEN SATURATION: 99 % | TEMPERATURE: 97.7 F | SYSTOLIC BLOOD PRESSURE: 94 MMHG

## 2018-10-17 VITALS
BODY MASS INDEX: 21.66 KG/M2 | SYSTOLIC BLOOD PRESSURE: 139 MMHG | HEART RATE: 81 BPM | TEMPERATURE: 98.4 F | RESPIRATION RATE: 16 BRPM | HEIGHT: 65 IN | WEIGHT: 130 LBS | OXYGEN SATURATION: 100 % | DIASTOLIC BLOOD PRESSURE: 78 MMHG

## 2018-10-17 DIAGNOSIS — R53.82 CHRONIC FATIGUE: Primary | ICD-10-CM

## 2018-10-17 DIAGNOSIS — F32.A ANXIETY AND DEPRESSION: Primary | ICD-10-CM

## 2018-10-17 DIAGNOSIS — R30.0 DYSURIA: ICD-10-CM

## 2018-10-17 DIAGNOSIS — F41.9 ANXIETY AND DEPRESSION: Primary | ICD-10-CM

## 2018-10-17 DIAGNOSIS — Z23 ENCOUNTER FOR IMMUNIZATION: ICD-10-CM

## 2018-10-17 DIAGNOSIS — F32.A DEPRESSION, UNSPECIFIED DEPRESSION TYPE: ICD-10-CM

## 2018-10-17 LAB
ALBUMIN SERPL-MCNC: 4.4 G/DL (ref 3.4–5)
ALBUMIN/GLOB SERPL: 1.2 {RATIO} (ref 0.8–1.7)
ALP SERPL-CCNC: 67 U/L (ref 45–117)
ALT SERPL-CCNC: 28 U/L (ref 13–56)
AMPHET UR QL SCN: NEGATIVE
ANION GAP SERPL CALC-SCNC: 5 MMOL/L (ref 3–18)
APPEARANCE UR: ABNORMAL
AST SERPL-CCNC: 12 U/L (ref 15–37)
BACTERIA URNS QL MICRO: ABNORMAL /HPF
BARBITURATES UR QL SCN: NEGATIVE
BASOPHILS # BLD: 0 K/UL (ref 0–0.1)
BASOPHILS NFR BLD: 1 % (ref 0–2)
BENZODIAZ UR QL: NEGATIVE
BILIRUB SERPL-MCNC: 0.4 MG/DL (ref 0.2–1)
BILIRUB UR QL: NEGATIVE
BUN SERPL-MCNC: 10 MG/DL (ref 7–18)
BUN/CREAT SERPL: 12 (ref 12–20)
CALCIUM SERPL-MCNC: 10.3 MG/DL (ref 8.5–10.1)
CANNABINOIDS UR QL SCN: NEGATIVE
CHLORIDE SERPL-SCNC: 105 MMOL/L (ref 100–108)
CO2 SERPL-SCNC: 32 MMOL/L (ref 21–32)
COCAINE UR QL SCN: NEGATIVE
COLOR UR: ABNORMAL
CREAT SERPL-MCNC: 0.85 MG/DL (ref 0.6–1.3)
DIFFERENTIAL METHOD BLD: ABNORMAL
EOSINOPHIL # BLD: 0 K/UL (ref 0–0.4)
EOSINOPHIL NFR BLD: 0 % (ref 0–5)
EPITH CASTS URNS QL MICRO: ABNORMAL /LPF (ref 0–5)
ERYTHROCYTE [DISTWIDTH] IN BLOOD BY AUTOMATED COUNT: 15.7 % (ref 11.6–14.5)
ETHANOL SERPL-MCNC: <3 MG/DL (ref 0–3)
GLOBULIN SER CALC-MCNC: 3.7 G/DL (ref 2–4)
GLUCOSE SERPL-MCNC: 114 MG/DL (ref 74–99)
GLUCOSE UR STRIP.AUTO-MCNC: NEGATIVE MG/DL
HCT VFR BLD AUTO: 41.2 % (ref 35–45)
HDSCOM,HDSCOM: NORMAL
HGB BLD-MCNC: 13.6 G/DL (ref 12–16)
HGB UR QL STRIP: ABNORMAL
KETONES UR QL STRIP.AUTO: NEGATIVE MG/DL
LEUKOCYTE ESTERASE UR QL STRIP.AUTO: NEGATIVE
LYMPHOCYTES # BLD: 1.3 K/UL (ref 0.9–3.6)
LYMPHOCYTES NFR BLD: 16 % (ref 21–52)
MCH RBC QN AUTO: 27.5 PG (ref 24–34)
MCHC RBC AUTO-ENTMCNC: 33 G/DL (ref 31–37)
MCV RBC AUTO: 83.2 FL (ref 74–97)
METHADONE UR QL: NEGATIVE
MONOCYTES # BLD: 0.4 K/UL (ref 0.05–1.2)
MONOCYTES NFR BLD: 5 % (ref 3–10)
NEUTS SEG # BLD: 6.3 K/UL (ref 1.8–8)
NEUTS SEG NFR BLD: 78 % (ref 40–73)
NITRITE UR QL STRIP.AUTO: NEGATIVE
OPIATES UR QL: NEGATIVE
PCP UR QL: NEGATIVE
PH UR STRIP: 7.5 [PH] (ref 5–8)
PLATELET # BLD AUTO: 354 K/UL (ref 135–420)
PMV BLD AUTO: 9.8 FL (ref 9.2–11.8)
POTASSIUM SERPL-SCNC: 4.6 MMOL/L (ref 3.5–5.5)
PROT SERPL-MCNC: 8.1 G/DL (ref 6.4–8.2)
PROT UR STRIP-MCNC: 100 MG/DL
RBC # BLD AUTO: 4.95 M/UL (ref 4.2–5.3)
RBC #/AREA URNS HPF: ABNORMAL /HPF (ref 0–5)
SODIUM SERPL-SCNC: 142 MMOL/L (ref 136–145)
SP GR UR REFRACTOMETRY: 1.01 (ref 1–1.03)
UROBILINOGEN UR QL STRIP.AUTO: 0.2 EU/DL (ref 0.2–1)
WBC # BLD AUTO: 8 K/UL (ref 4.6–13.2)
WBC URNS QL MICRO: ABNORMAL /HPF (ref 0–4)

## 2018-10-17 PROCEDURE — 81001 URINALYSIS AUTO W/SCOPE: CPT | Performed by: EMERGENCY MEDICINE

## 2018-10-17 PROCEDURE — 80307 DRUG TEST PRSMV CHEM ANLYZR: CPT | Performed by: EMERGENCY MEDICINE

## 2018-10-17 PROCEDURE — 80053 COMPREHEN METABOLIC PANEL: CPT | Performed by: EMERGENCY MEDICINE

## 2018-10-17 PROCEDURE — 99283 EMERGENCY DEPT VISIT LOW MDM: CPT

## 2018-10-17 PROCEDURE — 85025 COMPLETE CBC W/AUTO DIFF WBC: CPT | Performed by: EMERGENCY MEDICINE

## 2018-10-17 PROCEDURE — 99284 EMERGENCY DEPT VISIT MOD MDM: CPT

## 2018-10-17 RX ORDER — CEPHALEXIN 500 MG/1
500 CAPSULE ORAL 4 TIMES DAILY
Qty: 28 CAP | Refills: 0
Start: 2018-10-17 | End: 2018-10-24

## 2018-10-17 RX ORDER — DEXLANSOPRAZOLE 60 MG/1
60 CAPSULE, DELAYED RELEASE ORAL
COMMUNITY
Start: 2018-03-12 | End: 2019-01-24 | Stop reason: SDUPTHER

## 2018-10-17 RX ORDER — CEPHALEXIN 500 MG/1
500 CAPSULE ORAL 4 TIMES DAILY
Qty: 28 CAP | Refills: 0 | Status: SHIPPED | OUTPATIENT
Start: 2018-10-17 | End: 2018-10-17

## 2018-10-17 NOTE — ED NOTES
Per pt, she complains of \"feeling very overwhelmed, my coping mechanism is to shut down and I want to be able to have more energy. I quit drinking and I did have a relapse last week and feel hung over still\".

## 2018-10-17 NOTE — ED PROVIDER NOTES
EMERGENCY DEPARTMENT HISTORY AND PHYSICAL EXAM 
 
11:51 AM 
 
 
Date: 10/17/2018 Patient Name: Anabel Bird History of Presenting Illness Chief Complaint Patient presents with  Fatigue History Provided By: Patient Chief Complaint: Fatigue Duration: 1 Weeks Timing:  Acute and Constant Location: Generalized Quality: \"can't get out of bed\" Severity: Severe Modifying Factors: none reported Associated Symptoms: denies any other associated signs or symptoms Additional History (Context): Anabel Bird is a 58 y.o. female with etOH abuse, cancer, psychiatric disorders, HTN who presents with severe, constant fatigue for 1 wk. Pt feels tired, unable to get out of bed. She also notes mild stomach ache. Denies cold-like sx, congestion, cough. Denies SI, HI, hallucinations. Pt uses occasional etOH, no tobacco, cocaine, THC, illicit drugs. PCP: Tandy Bumpers, MD 
 
Current Outpatient Medications Medication Sig Dispense Refill  diazePAM (VALIUM) 10 mg tablet Take 1/2-1 po tid prn anxiety  0  pnv w/o calcium-iron fum-fa 27-1 mg tab Take  by mouth. Take one po daily  lisinopril (PRINIVIL, ZESTRIL) 2.5 mg tablet Take 1 Tab by mouth daily. 90 Tab 1  chromium-herb no.238 250-775 mcg-mg tab Take  by mouth daily. Past History Past Medical History: 
Past Medical History:  
Diagnosis Date  Alcohol abuse  Bipolar disorder ([facility] [address] [facility]  Bladder cancer ([facility] [address] [facility] 04/2011 Clinical stage Ta, well differentiated urothelial carcinoma of the bladder s/p TURBT. Dr. John Cruz  C4 cervical fracture ([facility] [address] C5 spinous process, C4 inferior fracture  Cervical radiculopathy 01/2018  Cervical stenosis of spine   
 severe stenosis behind C4-C5-C6 along with severe neural foraminal stenosis on left C5 and C6 nerve root.  Depression  Diverticulosis 01/2018  
 FH: breast cancer  Hematuria, microscopic  Hypercholesteremia  Hypertension  Nasal bone fracture 01/2018  
 nasal bone fractures, left greater than right. Bony nasal septum deformity, age indeterminate, possibly acute  Osteopenia  Seizures ([facility] [address] 01/23/2018  Vitamin D deficiency Past Surgical History: 
Past Surgical History:  
Procedure Laterality Date  CYSTOSCOPY  4/27/12  ENDOSCOPY, COLON, DIAGNOSTIC    
 HX BREAST AUGMENTATION  2001  
 flakito  HX CERVICAL FUSION  01/2018  
 anterior decompression fusion from C4-C7  HX UROLOGICAL Family History: 
Family History Problem Relation Age of Onset  Cancer Mother   
     breast  
 Stroke Mother  Cancer Father   
     lung  Cancer Sister Social History: 
Social History Tobacco Use  Smoking status: Never Smoker  Smokeless tobacco: Never Used  Tobacco comment: continue to not smoke Substance Use Topics  Alcohol use: Yes Alcohol/week: 14.0 oz Types: 28 Glasses of wine per week Comment: wine 2-4 glasses a day  Drug use: No  
 
 
Allergies: Allergies Allergen Reactions  Omeprazole Nausea and Vomiting  Codeine Nausea and Vomiting  Macrobid [Nitrofurantoin Monohyd/M-Cryst] Nausea and Vomiting Review of Systems Review of Systems Constitutional: Positive for fatigue. Negative for activity change and fever. HENT: Negative for congestion and rhinorrhea. Eyes: Negative for visual disturbance. Respiratory: Negative for cough and shortness of breath. Cardiovascular: Negative for chest pain and palpitations. Gastrointestinal: Negative for abdominal pain, diarrhea, nausea and vomiting. Genitourinary: Negative for dysuria and hematuria. Musculoskeletal: Negative for back pain. Skin: Negative for rash. Neurological: Positive for weakness (generalized). Negative for dizziness and light-headedness. Psychiatric/Behavioral: Negative for agitation, hallucinations and suicidal ideas.   
     Negative for HI  
 All other systems reviewed and are negative. Physical Exam  
 
Visit Vitals /77 (BP 1 Location: Right arm, BP Patient Position: At rest) Pulse 87 Temp 98.3 °F (36.8 °C) Resp 16 Ht 5' 5\" (1.651 m) Wt 59 kg (130 lb) SpO2 99% BMI 21.63 kg/m² Physical Exam  
Constitutional: She appears well-developed and well-nourished. No distress. HENT:  
Head: Normocephalic and atraumatic. Right Ear: External ear normal.  
Left Ear: External ear normal.  
Nose: Nose normal.  
Mouth/Throat: Oropharynx is clear and moist.  
Eyes: Conjunctivae and EOM are normal. Pupils are equal, round, and reactive to light. No scleral icterus. No pale conjunctiva Neck: Normal range of motion. Neck supple. No JVD present. No tracheal deviation present. No thyromegaly present. Cardiovascular: Normal rate and regular rhythm. Exam reveals no friction rub. No murmur heard. Pulmonary/Chest: Effort normal and breath sounds normal. No stridor. She exhibits no tenderness. Abdominal: Soft. Bowel sounds are normal. She exhibits no distension, no pulsatile midline mass and no mass. There is no tenderness. There is no rebound and no guarding. No hernia. Musculoskeletal: Normal range of motion. She exhibits no edema or tenderness. Lymphadenopathy:  
  She has no cervical adenopathy. Neurological: She is alert. No cranial nerve deficit. Coordination normal.  
Skin: Skin is warm and dry. Psychiatric: She has a normal mood and affect. Her behavior is normal. Judgment and thought content normal.  
Nursing note and vitals reviewed. Diagnostic Study Results Labs - Recent Results (from the past 12 hour(s)) CBC WITH AUTOMATED DIFF Collection Time: 10/17/18 11:40 AM  
Result Value Ref Range WBC 8.0 4.6 - 13.2 K/uL  
 RBC 4.95 4.20 - 5.30 M/uL  
 HGB 13.6 12.0 - 16.0 g/dL HCT 41.2 35.0 - 45.0 % MCV 83.2 74.0 - 97.0 FL  
 MCH 27.5 24.0 - 34.0 PG  
 MCHC 33.0 31.0 - 37.0 g/dL RDW 15.7 (H) 11.6 - 14.5 % PLATELET 625 295 - 440 K/uL MPV 9.8 9.2 - 11.8 FL  
 NEUTROPHILS 78 (H) 40 - 73 % LYMPHOCYTES 16 (L) 21 - 52 % MONOCYTES 5 3 - 10 % EOSINOPHILS 0 0 - 5 % BASOPHILS 1 0 - 2 %  
 ABS. NEUTROPHILS 6.3 1.8 - 8.0 K/UL  
 ABS. LYMPHOCYTES 1.3 0.9 - 3.6 K/UL  
 ABS. MONOCYTES 0.4 0.05 - 1.2 K/UL  
 ABS. EOSINOPHILS 0.0 0.0 - 0.4 K/UL  
 ABS. BASOPHILS 0.0 0.0 - 0.1 K/UL  
 DF AUTOMATED METABOLIC PANEL, COMPREHENSIVE Collection Time: 10/17/18 11:40 AM  
Result Value Ref Range Sodium 142 136 - 145 mmol/L Potassium 4.6 3.5 - 5.5 mmol/L Chloride 105 100 - 108 mmol/L  
 CO2 32 21 - 32 mmol/L Anion gap 5 3.0 - 18 mmol/L Glucose 114 (H) 74 - 99 mg/dL BUN 10 7.0 - 18 MG/DL Creatinine 0.85 0.6 - 1.3 MG/DL  
 BUN/Creatinine ratio 12 12 - 20 GFR est AA >60 >60 ml/min/1.73m2 GFR est non-AA >60 >60 ml/min/1.73m2 Calcium 10.3 (H) 8.5 - 10.1 MG/DL Bilirubin, total 0.4 0.2 - 1.0 MG/DL  
 ALT (SGPT) 28 13 - 56 U/L  
 AST (SGOT) 12 (L) 15 - 37 U/L Alk. phosphatase 67 45 - 117 U/L Protein, total 8.1 6.4 - 8.2 g/dL Albumin 4.4 3.4 - 5.0 g/dL Globulin 3.7 2.0 - 4.0 g/dL A-G Ratio 1.2 0.8 - 1.7 ETHYL ALCOHOL Collection Time: 10/17/18 11:40 AM  
Result Value Ref Range ALCOHOL(ETHYL),SERUM <3 0 - 3 MG/DL URINALYSIS W/ RFLX MICROSCOPIC Collection Time: 10/17/18 12:05 PM  
Result Value Ref Range Color DARK YELLOW Appearance CLOUDY Specific gravity 1.014 1.005 - 1.030    
 pH (UA) 7.5 5.0 - 8.0 Protein 100 (A) NEG mg/dL Glucose NEGATIVE  NEG mg/dL Ketone NEGATIVE  NEG mg/dL Bilirubin NEGATIVE  NEG Blood TRACE (A) NEG Urobilinogen 0.2 0.2 - 1.0 EU/dL Nitrites NEGATIVE  NEG Leukocyte Esterase NEGATIVE  NEG    
DRUG SCREEN, URINE Collection Time: 10/17/18 12:05 PM  
Result Value Ref Range  BENZODIAZEPINES NEGATIVE  NEG    
 BARBITURATES NEGATIVE  NEG    
 THC (TH-CANNABINOL) NEGATIVE  NEG    
 OPIATES NEGATIVE  NEG    
 PCP(PHENCYCLIDINE) NEGATIVE  NEG    
 COCAINE NEGATIVE  NEG    
 AMPHETAMINES NEGATIVE  NEG METHADONE NEGATIVE  NEG HDSCOM (NOTE) URINE MICROSCOPIC ONLY Collection Time: 10/17/18 12:05 PM  
Result Value Ref Range WBC 0 to 2 0 - 4 /hpf  
 RBC 0 to 2 0 - 5 /hpf Epithelial cells FEW 0 - 5 /lpf Bacteria 2+ (A) NEG /hpf Radiologic Studies - No orders to display Medical Decision Making I am the first provider for this patient. I reviewed the vital signs, available nursing notes, past medical history, past surgical history, family history and social history. Vital Signs-Reviewed the patient's vital signs. Pulse Oximetry Analysis -  99% on room air (Interpretation) wnl 
Cardiac Monitor: 
Rate: 87 Rhythm:  Normal Sinus Rhythm Records Reviewed: Nursing Notes and Old Medical Records (Time of Review: 11:51 AM) ED Course: Progress Notes, Reevaluation, and Consults: 
Pt in no distress Agrees with the plan to discharge Pt with no abd pain. + concern for dysuria, would like to be treated. Provider Notes (Medical Decision Making): Pt with fatigue generalized weakness bipolar depression no acute psychosis requesting to follow up with a psychiatrist, case management reviewed the follow up. Pt's labs, UA, and electrolyte without any abnormality. Diagnosis Clinical Impression: 1. Chronic fatigue 2. Depression, unspecified depression type Disposition: Discharge Follow-up Information None Medication List  
  
ASK your doctor about these medications   
chromium-herb no.238 250-775 mcg-mg Tab 
  
diazePAM 10 mg tablet Commonly known as:  VALIUM 
  
lisinopril 2.5 mg tablet Commonly known as:  Seretha Siad Take 1 Tab by mouth daily. pnv w/o calcium-iron fum-fa 27-1 mg Tab 
  
  
 
_______________________________ Attestations: 
Nicolasaibe Attestation Crista Vazquez acting as a scribe for and in the presence of Nakul Gregorio MD     
October 17, 2018 at 1:15 PM 
    
Provider Attestation:     
I personally performed the services described in the documentation, reviewed the documentation, as recorded by the scribe in my presence, and it accurately and completely records my words and actions. October 17, 2018 at 1:15 PM - Nakul Gregorio MD   
_______________________________

## 2018-10-17 NOTE — DISCHARGE INSTRUCTIONS
Depression and Chronic Disease: Care Instructions  Your Care Instructions    A chronic disease is one that you have for a long time. Some chronic diseases can be controlled, but they usually cannot be cured. Depression is common in people with chronic diseases, but it often goes unnoticed. Many people have concerns about seeking treatment for a mental health problem. You may think it's a sign of weakness, or you don't want people to know about it. It's important to overcome these reasons for not seeking treatment. Treating depression or anxiety is good for your health. Follow-up care is a key part of your treatment and safety. Be sure to make and go to all appointments, and call your doctor if you are having problems. It's also a good idea to know your test results and keep a list of the medicines you take. How can you care for yourself at home? Watch for symptoms of depression  The symptoms of depression are often subtle at first. You may think they are caused by your disease rather than depression. Or you may think it is normal to be depressed when you have a chronic disease. If you are depressed you may:  · Feel sad or hopeless. · Feel guilty or worthless. · Not enjoy the things you used to enjoy. · Feel hopeless, as though life is not worth living. · Have trouble thinking or remembering. · Have low energy, and you may not eat or sleep well. · Pull away from others. · Think often about death or killing yourself. (Keep the numbers for these national suicide hotlines: 3-406-488-TALK [1-732.654.2515] and 8-188-HJLVMCO [1-360.996.4978]. )  Get treatment  By treating your depression, you can feel more hopeful and have more energy. If you feel better, you may take better care of yourself, so your health may improve. · Talk to your doctor if you have any changes in mood during treatment for your disease. · Ask your doctor for help.  Counseling, antidepressant medicine, or a combination of the two can help most people with depression. Often a combination works best. Counseling can also help you cope with having a chronic disease. When should you call for help? Call 911 anytime you think you may need emergency care. For example, call if:    · You feel like hurting yourself or someone else.     · Someone you know has depression and is about to attempt or is attempting suicide.   [facility] your doctor now or seek immediate medical care if:    · You hear voices.     · Someone you know has depression and:  ? Starts to give away his or her possessions. ? Uses illegal drugs or drinks alcohol heavily. ? Talks or writes about death, including writing suicide notes or talking about guns, knives, or pills. ? Starts to spend a lot of time alone. ? Acts very aggressively or suddenly appears calm.    Watch closely for changes in your health, and be sure to contact your doctor if:    · You do not get better as expected. Where can you learn more? Go to http://kaseyH[employer] Esquireyamilet.info/. Enter T400 in the search box to learn more about \"Depression and Chronic Disease: Care Instructions. \"  Current as of: December 7, 2017  Content Version: 11.8  © 5884-8613 [employer]. Care instructions adapted under license by [employer] (which disclaims liability or warranty for this information). If you have questions about a medical condition or this instruction, always ask your healthcare professional. [place] [address] any warranty or liability for your use of this information. Painful Urination (Dysuria): Care Instructions  Your Care Instructions  Burning pain with urination (dysuria) is a common symptom of a urinary tract infection or other urinary problems. The bladder may become inflamed. This can cause pain when the bladder fills and empties.  You may also feel pain if the tube that carries urine from the bladder to the outside of the body (urethra) gets irritated or infected. Sexually transmitted infections (STIs) also may cause pain when you urinate. Sometimes the pain can be caused by things other than an infection. The urethra can be irritated by soaps, perfumes, or foreign objects in the urethra. Kidney stones can cause pain when they pass through the urethra. The cause may be hard to find. You may need tests. Treatment for painful urination depends on the cause. Follow-up care is a key part of your treatment and safety. Be sure to make and go to all appointments, and call your doctor if you are having problems. It's also a good idea to know your test results and keep a list of the medicines you take. How can you care for yourself at home? · Drink extra water for the next day or two. This will help make the urine less concentrated. (If you have kidney, heart, or liver disease and have to limit fluids, talk with your doctor before you increase the amount of fluids you drink.)  · Avoid drinks that are carbonated or have caffeine. They can irritate the bladder. · Urinate often. Try to empty your bladder each time. For women:  · Urinate right after you have sex. · After going to the bathroom, wipe from front to back. · Avoid douches, bubble baths, and feminine hygiene sprays. And avoid other feminine hygiene products that have deodorants. When should you call for help? Call your doctor now or seek immediate medical care if:    · You have new symptoms, such as fever, nausea, or vomiting.     · You have new or worse symptoms of a urinary problem. For example:  ? You have blood or pus in your urine. ? You have chills or body aches. ? It hurts worse to urinate. ? You have groin or belly pain. ? You have pain in your back just below your rib cage (the flank area).    Watch closely for changes in your health, and be sure to contact your doctor if you have any problems. Where can you learn more? Go to http://kasey-yamilet.info/.   Enter M307 in the search box to learn more about \"Painful Urination (Dysuria): Care Instructions. \"  Current as of: March 21, 2018  Content Version: 11.8  © 8141-2267 [employer]. Care instructions adapted under license by [employer] (which disclaims liability or warranty for this information). If you have questions about a medical condition or this instruction, always ask your healthcare professional. [place] [address] any warranty or liability for your use of this information.

## 2018-10-17 NOTE — TELEPHONE ENCOUNTER
Patient calling to have low dose of Prozac called in until she can get in with Psych please advise or call patient when available

## 2018-10-17 NOTE — PROGRESS NOTES
ROOM # 2    Parul Quinones presents today for   Chief Complaint   Patient presents with   [facility] Follow Up     [facility] ER f/u for depression and anxiety    Medication Refill       Parul Quinones preferred language for health care discussion is english/other. Is someone accompanying this pt? no    Is the patient using any DME equipment during OV? no    Depression Screening:  PHQ over the last two weeks 7/18/2018 2/22/2018 1/18/2018 1/30/2014   Little interest or pleasure in doing things Several days Nearly every day Nearly every day Not at all   Feeling down, depressed, irritable, or hopeless Several days Nearly every day Nearly every day Not at all   Total Score PHQ 2 2 6 6 0       Learning Assessment:  Learning Assessment 7/18/2018 1/30/2014   PRIMARY LEARNER Patient Patient   HIGHEST LEVEL OF EDUCATION - PRIMARY LEARNER  4 YEARS OF COLLEGE -   BARRIERS PRIMARY LEARNER NONE -   [address] CAREGIVER No -   PRIMARY LANGUAGE ENGLISH ENGLISH   LEARNER PREFERENCE PRIMARY READING READING   ANSWERED BY patient patient   RELATIONSHIP SELF SELF       Abuse Screening:  Abuse Screening Questionnaire 7/18/2018   Do you ever feel afraid of your partner? N   Are you in a relationship with someone who physically or mentally threatens you? N   Is it safe for you to go home? Y       Fall Risk  No flowsheet data found. Health Maintenance reviewed and discussed per provider. Yes    Parul Quinones is due for   Health Maintenance Due   Topic Date Due    Shingrix Vaccine Age 49> (1 of 2) 10/18/2005    Influenza Age 5 to Adult  08/01/2018         Please order/place referral if appropriate. Advance Directive:  1. Do you have an advance directive in place? Patient Reply: no    2. If not, would you like material regarding how to put one in place? Patient Reply: no    Coordination of Care:  1. Have you been to the ER, urgent care clinic since your last visit? Hospitalized since your last visit? no    2.  Have you seen or consulted any other health care providers outside of the [address] [place] [address] since your last visit? Include any pap smears or colon screening.  no

## 2018-10-17 NOTE — PROGRESS NOTES
Referral received from ED MD to assist with psych follow up. Chart reviewed. Met with pt at bedside. Pt states that she doesn't have any energy and requesting a prescription for Abilify. Pt was made aware that ED MD will not write RX. Pt wants to see psychiatrist now or tomorrow. Explained that can assist and call to schedule appointment but no guarantee that she will be seen today or tomorrow. Pt then said that she will see her PCP(walk in) and will ask referral to psychiatry.

## 2018-10-17 NOTE — PROGRESS NOTES
Chief Complaint   Patient presents with   [facility] Follow Up     [facility] [address] ER f/u for depression and anxiety    Medication Refill       HPI:     Vista Duverney is a 58 y.o.  female with history of depression and hypertension   here for the above complaint. She said she went to [address] [place] [address] Drive ER on 10/17/18 for fatigue and depression and anxiety. ER records were reviewed. Labs showed UTI. She was given rx for keflex. She wanted rx of abilify, but ER said cannot fill. She said she wanted to see her PCP for further assistance. She said for 1 week she has been feeling anxious and tired and depressed. She is having \"knots\" in her abdominal area. No suicidal or homicidal ideations. She is eating once a day. No crying spells and she feels like she cannot do her bills. She does not have her \"confidence level\". Zyprexa and effexor made her feel tired. Lexapro made her feel down. She is worried about taking various psych meds due to serious adverse reactions of seizures. She denies any chest pain, shortness of breath, headaches or dizziness. She has some nausea from anxiety. She denies suicidal or homicidal ideations. Past Medical History:   Diagnosis Date    Alcohol abuse     Bipolar disorder ([address]     Bladder cancer ([address] 04/2011    Clinical stage Ta, well differentiated urothelial carcinoma of the bladder s/p TURBT. Dr. Candace Burt C4 cervical fracture [facility])     C5 spinous process, C4 inferior fracture    Cervical radiculopathy 01/2018    Cervical stenosis of spine     severe stenosis behind C4-C5-C6 along with severe neural foraminal stenosis on left C5 and C6 nerve root.  Depression     Diverticulosis 01/2018    FH: breast cancer     Hematuria, microscopic     Hypercholesteremia     Hypertension     Nasal bone fracture 01/2018    nasal bone fractures, left greater than right.  Bony nasal septum deformity, age indeterminate, possibly acute    Osteopenia     Seizures ([address] 01/23/2018    Vitamin D deficiency      Past Surgical History:   Procedure Laterality Date    CYSTOSCOPY  4/27/12    ENDOSCOPY, COLON, DIAGNOSTIC      HX BREAST AUGMENTATION  2001    flakito    HX CERVICAL FUSION  01/2018    anterior decompression fusion from C4-C7    HX UROLOGICAL       Current Outpatient Medications   Medication Sig    Dexlansoprazole (DEXILANT) 60 mg CpDB 60 mg.    cephALEXin (KEFLEX) 500 mg capsule Take 1 Cap by mouth four (4) times daily for 7 days.  pnv w/o calcium-iron fum-fa 27-1 mg tab Take  by mouth. Take one po daily    lisinopril (PRINIVIL, ZESTRIL) 2.5 mg tablet Take 1 Tab by mouth daily.  chromium-herb no.238 250-775 mcg-mg tab Take  by mouth daily. No current facility-administered medications for this visit. Health Maintenance   Topic Date Due    Shingrix Vaccine Age 49> (1 of 2) 10/18/2005    Influenza Age 5 to Adult  08/01/2018    Pneumococcal 19-64 Highest Risk (2 of 3 - PCV13) 01/16/2019    BREAST CANCER SCRN MAMMOGRAM  03/15/2019    COLONOSCOPY  04/23/2019    PAP AKA CERVICAL CYTOLOGY  07/10/2021    DTaP/Tdap/Td series (3 - Td) 01/23/2028    Hepatitis C Screening  Completed     Immunization History   Administered Date(s) Administered    Influenza Vaccine (Quad) PF 01/18/2018, 10/17/2018    PPD 09/06/2011    Pneumococcal Polysaccharide (PPSV-23) 01/16/2018    TD Vaccine 07/27/2007    Td 07/27/2007    Tdap 02/26/2015, 06/20/2017, 01/23/2018    Zoster Vaccine, Live 05/13/2016     No LMP recorded. Patient is postmenopausal.        Allergies and Intolerances: Allergies   Allergen Reactions    Omeprazole Nausea and Vomiting    Codeine Nausea and Vomiting    Macrobid [Nitrofurantoin Monohyd/M-Cryst] Nausea and Vomiting       Family History:   Family History   Problem Relation Age of Onset    Cancer Mother         breast    Stroke Mother     Cancer Father         lung    Cancer Sister        Social History:   She  reports that  has never smoked.  she has never used smokeless tobacco.  She  reports that she drinks about 14.0 oz of alcohol per week. OBJECTIVE:   Physical exam:   Visit Vitals  BP 94/62 (BP 1 Location: Left arm, BP Patient Position: Sitting)   Pulse (!) 101   Temp 97.7 °F (36.5 °C) (Oral)   Resp 16   Ht 5' 5\" (1.651 m)   Wt 138 lb 6.4 oz (62.8 kg)   SpO2 99%   BMI 23.03 kg/m²        Generally: Pleasant female in no acute distress  Cardiac Exam: regular, rate, and rhythm. Normal S1 and S2. No murmurs, gallops, or rubs  Pulmonary exam: Clear to auscultation bilaterally  Abdominal exam: Positive bowel sounds in all four quadrants, soft, nondistended, nontender  Extremities: 2+ dorsalis pedis pulses bilaterally. No pedal edema    bilaterally    LABS/RADIOLOGICAL TESTS:  Lab Results   Component Value Date/Time    WBC 8.0 10/17/2018 11:40 AM    HGB 13.6 10/17/2018 11:40 AM    HCT 41.2 10/17/2018 11:40 AM    PLATELET 238 22/74/8227 11:40 AM     Lab Results   Component Value Date/Time    Sodium 142 10/17/2018 11:40 AM    Potassium 4.6 10/17/2018 11:40 AM    Chloride 105 10/17/2018 11:40 AM    CO2 32 10/17/2018 11:40 AM    Glucose 114 (H) 10/17/2018 11:40 AM    BUN 10 10/17/2018 11:40 AM    Creatinine 0.85 10/17/2018 11:40 AM     Lab Results   Component Value Date/Time    Cholesterol, total 229 (H) 07/18/2018 10:26 AM    HDL Cholesterol 116 (H) 07/18/2018 10:26 AM    LDL, calculated 100 (H) 07/18/2018 10:26 AM    Triglyceride 65 07/18/2018 10:26 AM     No results found for: GPT    Previous labs    ASSESSMENT/PLAN:    1. Anxiety and depression: gave pt multiple options for psych meds to help with both depression and anxiety. However, she refused to take these medications due to possible adverse side effdect of seizures. She was finally agreeable to just refer her to psychiatry and she will make an appt. With them ASAP.   -     REFERRAL TO PSYCHIATRY    2. Encounter for immunization  -     INFLUENZA VIRUS VAC QUAD,SPLIT,PRESV FREE SYRINGE IM    3.    Requested Prescriptions     Signed Prescriptions Disp Refills    cephALEXin (KEFLEX) 500 mg capsule 28 Cap 0     Sig: Take 1 Cap by mouth four (4) times daily for 7 days. 4. Patient verbalized understanding and agreement with the plan. 5. Patient was given an after-visit summary. 6. Follow-up Disposition:  Return if symptoms worsen or fail to improve. or sooner if worsening symptoms.           Alfie Sol M.D.

## 2018-10-17 NOTE — ED NOTES
Patient reports she is depressed and has lost interest in taking care of self. Patient reports she is bipolar and her depression meds are not helping. Patient denies SI/HI. She wants some help to figure out what is wrong with her.

## 2018-10-17 NOTE — ED TRIAGE NOTES
Pt ambulatory to triage stating \"I can't get out of bed, can't eat. I don't know if it's emotional or physical\" Pt states this has been going on 4-5 days. Pt states she doesn't drink anymore bc it makes her sick, but when she does drink she drinks a bottle of wine. Pt states her last drink was a wk ago.

## 2018-10-18 RX ORDER — FLUOXETINE HYDROCHLORIDE 20 MG/1
20 CAPSULE ORAL DAILY
Qty: 30 CAP | Refills: 1 | Status: SHIPPED | OUTPATIENT
Start: 2018-10-18 | End: 2019-02-06 | Stop reason: SDUPTHER

## 2018-10-18 NOTE — TELEPHONE ENCOUNTER
Sent electronically:    Requested Prescriptions     Signed Prescriptions Disp Refills    FLUoxetine (PROZAC) 20 mg capsule 30 Cap 1     Sig: Take 1 Cap by mouth daily. Authorizing Provider: Be Loya     She needs to let us know if she cannot get an appt. Within 1 month with psych. If she can't, she will need to make an appt. With me.

## 2018-10-18 NOTE — TELEPHONE ENCOUNTER
Called pt 2 pt identifiers confirmed informed pt of med refill and informed her that if she does not get an appt within 30 days with psych she will need to make an appt with Dr Lannie Dance. Pt stated understanding no other questions or concerns noted at this time.

## 2018-11-14 ENCOUNTER — TELEPHONE (OUTPATIENT)
Dept: INTERNAL MEDICINE CLINIC | Age: 63
End: 2018-11-14

## 2018-11-16 NOTE — TELEPHONE ENCOUNTER
Attempted to contact pt at  number, no answer. Pt mailbox is full and cannot accept any messages. Will continue to try to contact pt.

## 2018-11-19 NOTE — TELEPHONE ENCOUNTER
Attempted to contact pt at numbers listed below,no answer unable to lvm mailbox full. 2nd number disconnected. Encounter will be closed and letter sent out.

## 2018-12-20 ENCOUNTER — TELEPHONE (OUTPATIENT)
Dept: INTERNAL MEDICINE CLINIC | Age: 63
End: 2018-12-20

## 2018-12-20 NOTE — TELEPHONE ENCOUNTER
Please call pt to set up a 30 minute appt. For hospital f/u. Pt went to [facility] from 12/11/18-12/19/18 for depression and afib. Needs appt. 1 week from 12/19/18.

## 2019-01-24 ENCOUNTER — OFFICE VISIT (OUTPATIENT)
Dept: INTERNAL MEDICINE CLINIC | Age: 64
End: 2019-01-24

## 2019-01-24 VITALS
TEMPERATURE: 97.4 F | SYSTOLIC BLOOD PRESSURE: 140 MMHG | HEART RATE: 80 BPM | RESPIRATION RATE: 18 BRPM | WEIGHT: 144 LBS | OXYGEN SATURATION: 98 % | BODY MASS INDEX: 23.99 KG/M2 | DIASTOLIC BLOOD PRESSURE: 90 MMHG | HEIGHT: 65 IN

## 2019-01-24 DIAGNOSIS — K21.9 GASTROESOPHAGEAL REFLUX DISEASE WITHOUT ESOPHAGITIS: ICD-10-CM

## 2019-01-24 DIAGNOSIS — F32.A DEPRESSION, UNSPECIFIED DEPRESSION TYPE: Primary | ICD-10-CM

## 2019-01-24 DIAGNOSIS — D64.9 ANEMIA, UNSPECIFIED TYPE: ICD-10-CM

## 2019-01-24 RX ORDER — DEXLANSOPRAZOLE 60 MG/1
CAPSULE, DELAYED RELEASE ORAL
Qty: 90 CAP | Refills: 3 | Status: SHIPPED | OUTPATIENT
Start: 2019-01-24 | End: 2019-01-29 | Stop reason: SDUPTHER

## 2019-01-24 RX ORDER — FLECAINIDE ACETATE 100 MG/1
100 TABLET ORAL 2 TIMES DAILY
COMMUNITY
Start: 2019-01-07 | End: 2019-02-06

## 2019-01-24 RX ORDER — DIVALPROEX SODIUM 250 MG/1
TABLET, EXTENDED RELEASE ORAL
COMMUNITY
Start: 2019-01-07 | End: 2019-01-24

## 2019-01-24 RX ORDER — FLECAINIDE ACETATE 100 MG/1
TABLET ORAL
COMMUNITY
Start: 2018-12-19 | End: 2019-01-24

## 2019-01-24 RX ORDER — OLANZAPINE 10 MG/1
TABLET ORAL
Refills: 0 | COMMUNITY
Start: 2019-01-22 | End: 2019-02-08 | Stop reason: SDUPTHER

## 2019-01-24 NOTE — PROGRESS NOTES
ROOM # 2    Ko Berrios presents today for   Chief Complaint   Patient presents with   [facility] Follow Up     [facility] f/u for weakness and fatigue, decreased appetite, depression    Medication Refill    Form Completion       oK Berrios preferred language for health care discussion is english/other. Is someone accompanying this pt? no    Is the patient using any DME equipment during OV? no    Depression Screening:  PHQ over the last two weeks 7/18/2018 2/22/2018 1/18/2018 1/30/2014   Little interest or pleasure in doing things Several days Nearly every day Nearly every day Not at all   Feeling down, depressed, irritable, or hopeless Several days Nearly every day Nearly every day Not at all   Total Score PHQ 2 2 6 6 0       Learning Assessment:  Learning Assessment 7/18/2018 1/30/2014   PRIMARY LEARNER Patient Patient   HIGHEST LEVEL OF EDUCATION - PRIMARY LEARNER  4 YEARS OF COLLEGE -   BARRIERS PRIMARY LEARNER NONE -   Isabel Dilip CAREGIVER No -   PRIMARY LANGUAGE ENGLISH ENGLISH   LEARNER PREFERENCE PRIMARY READING READING   ANSWERED BY patient patient   RELATIONSHIP SELF SELF       Abuse Screening:  Abuse Screening Questionnaire 7/18/2018   Do you ever feel afraid of your partner? N   Are you in a relationship with someone who physically or mentally threatens you? N   Is it safe for you to go home? Y       Fall Risk  No flowsheet data found. Health Maintenance reviewed and discussed per provider. Yes    Ko Berrios is due for   Health Maintenance Due   Topic Date Due    Shingrix Vaccine Age 49> (1 of 2) 10/18/2005    BREAST CANCER SCRN MAMMOGRAM  03/15/2019    COLONOSCOPY  04/23/2019   HM to be d/w provider      Please order/place referral if appropriate. Advance Directive:  1. Do you have an advance directive in place? Patient Reply: no    2. If not, would you like material regarding how to put one in place? Patient Reply: no    Coordination of Care:  1.  Have you been to the ER, urgent care clinic since your last visit? Hospitalized since your last visit? [facility]    2. Have you seen or consulted any other health care providers outside of the [address] [place] [address] since your last visit? Include any pap smears or colon screening.  no

## 2019-01-24 NOTE — PROGRESS NOTES
Chief Complaint   Patient presents with   [facility] Follow Up     [facility] f/u for weakness and fatigue, decreased appetite, depression    Medication Refill    Form Completion       HPI:     Keila Gama is a 61 y.o.  female with history of   here for the above complaint. Pt admitted at [facility] from 12/11/18-12/19/18 for depression. Hospitalization is complicated by :   PSVT (paroxysmal supraventricular tachycardia) / A Fib 12/13/2018    UTI (urinary tract infection) 12/11/2018    Bladder tumor--hs of 12/11/2018 sees urology    Lesion of bladder--2011-->malignant 12/11/2018   Hospital records were reviewed. She also had UTI. Labs showed anemia. She has been following up with cardiology, Dr. Diaz Au  She was started on eliquis and flecanide. She had not been taking her lisinopril and just started today. She is seeing Dr. Emi Baez and she is now also on seroquel in addition to the prozac. She denies any chest pain, shortness of breath, abdominal pain, headaches or dizziness. Hospital records were reviewed. Past Medical History:   Diagnosis Date    Alcohol abuse     Arrhythmia 01/2019    Bipolar disorder ([address]     Bladder cancer ([address] 04/2011    Clinical stage Ta, well differentiated urothelial carcinoma of the bladder s/p TURBT. Dr. Jorge Wilson C4 cervical fracture [facility])     C5 spinous process, C4 inferior fracture    Cervical radiculopathy 01/2018    Cervical stenosis of spine     severe stenosis behind C4-C5-C6 along with severe neural foraminal stenosis on left C5 and C6 nerve root.  Depression     Diverticulosis 01/2018    FH: breast cancer     Hematuria, microscopic     Hypercholesteremia     Hypertension     Nasal bone fracture 01/2018    nasal bone fractures, left greater than right.  Bony nasal septum deformity, age indeterminate, possibly acute    Osteopenia     Seizures ([address] 01/23/2018    Vitamin D deficiency      Past Surgical History:   Procedure Laterality Date    CYSTOSCOPY  4/27/12    ENDOSCOPY, COLON, DIAGNOSTIC      HX BREAST AUGMENTATION  2001    flakito    HX CERVICAL FUSION  01/2018    anterior decompression fusion from C4-C7    HX UROLOGICAL       Current Outpatient Medications   Medication Sig    apixaban (ELIQUIS) 5 mg tablet 5 mg.  flecainide (TAMBOCOR) 100 mg tablet Take 100 mg by mouth two (2) times a day.  OLANZapine (ZYPREXA) 10 mg tablet TK 1 T PO TID    dexlansoprazole (DEXILANT) 60 mg CpDB capsule (delayed release) Take one po daily prn acid reflux    lisinopril (PRINIVIL, ZESTRIL) 2.5 mg tablet TAKE 1 TABLET BY MOUTH DAILY    FLUoxetine (PROZAC) 20 mg capsule Take 1 Cap by mouth daily.  pnv w/o calcium-iron fum-fa 27-1 mg tab Take  by mouth. Take one po daily     No current facility-administered medications for this visit. Health Maintenance   Topic Date Due    Shingrix Vaccine Age 49> (1 of 2) 10/18/2005    BREAST CANCER SCRN MAMMOGRAM  03/15/2019    COLONOSCOPY  04/23/2019    PAP AKA CERVICAL CYTOLOGY  07/10/2021    DTaP/Tdap/Td series (3 - Td) 01/23/2028    Hepatitis C Screening  Completed    Influenza Age 5 to Adult  Completed     Immunization History   Administered Date(s) Administered    Influenza Vaccine (Quad) PF 01/18/2018, 10/17/2018    PPD 09/06/2011    Pneumococcal Polysaccharide (PPSV-23) 01/16/2018    TD Vaccine 07/27/2007    Td 07/27/2007    Tdap 02/26/2015, 06/20/2017, 01/23/2018    Zoster Vaccine, Live 05/13/2016     No LMP recorded. Patient is postmenopausal.        Allergies and Intolerances:    Allergies   Allergen Reactions    Omeprazole Nausea and Vomiting    Ciprofloxacin Unknown (comments)     Light headed    Codeine Nausea and Vomiting    Macrobid [Nitrofurantoin Monohyd/M-Cryst] Nausea and Vomiting       Family History:   Family History   Problem Relation Age of Onset    Cancer Mother         breast    Stroke Mother     Cancer Father         lung    Cancer Sister        Social History:   She reports that  has never smoked. she has never used smokeless tobacco.  She  reports that she drinks about 14.0 oz of alcohol per week. OBJECTIVE:   Physical exam:   Visit Vitals  /90 (BP 1 Location: Left arm, BP Patient Position: Sitting)   Pulse 80   Temp 97.4 °F (36.3 °C) (Oral)   Resp 18   Ht 5' 5\" (1.651 m)   Wt 144 lb (65.3 kg)   SpO2 98%   BMI 23.96 kg/m²        Generally: Pleasant female in no acute distress  Cardiac Exam: regular, rate, and rhythm. Normal S1 and S2. No murmurs, gallops, or rubs  Pulmonary exam: Clear to auscultation bilaterally  Abdominal exam: Positive bowel sounds in all four quadrants, soft, nondistended, nontender  Extremities: 2+ dorsalis pedis pulses bilaterally. No pedal edema    bilaterally    LABS/RADIOLOGICAL TESTS:  Lab Results   Component Value Date/Time    WBC 8.0 10/17/2018 11:40 AM    HGB 13.6 10/17/2018 11:40 AM    HCT 41.2 10/17/2018 11:40 AM    PLATELET 097 98/69/4786 11:40 AM     Lab Results   Component Value Date/Time    Sodium 142 10/17/2018 11:40 AM    Potassium 4.6 10/17/2018 11:40 AM    Chloride 105 10/17/2018 11:40 AM    CO2 32 10/17/2018 11:40 AM    Glucose 114 (H) 10/17/2018 11:40 AM    BUN 10 10/17/2018 11:40 AM    Creatinine 0.85 10/17/2018 11:40 AM     Lab Results   Component Value Date/Time    Cholesterol, total 229 (H) 07/18/2018 10:26 AM    HDL Cholesterol 116 (H) 07/18/2018 10:26 AM    LDL, calculated 100 (H) 07/18/2018 10:26 AM    Triglyceride 65 07/18/2018 10:26 AM     No results found for: GPT    Previous labs    ASSESSMENT/PLAN:    1. Depression, unspecified depression type: stable. Continue the prozac and seroquel. 2. Anemia, unspecified type  -     CBC W/O DIFF; Future    3. Gastroesophageal reflux disease without esophagitis  -     dexlansoprazole (DEXILANT) 60 mg CpDB capsule (delayed release); Take one po daily prn acid reflux    4.    Requested Prescriptions     Signed Prescriptions Disp Refills    dexlansoprazole (DEXILANT) 60 mg CpDB capsule (delayed release) 90 Cap 3     Sig: Take one po daily prn acid reflux     5. Patient verbalized understanding and agreement with the plan. 6. Patient was given an after-visit summary. 7. Follow-up Disposition:  Return in about 1 month (around 2/24/2019) for f/u HTN. or sooner if worsening symptoms.           Gaston Hamilton M.D.

## 2019-01-29 DIAGNOSIS — K21.9 GASTROESOPHAGEAL REFLUX DISEASE WITHOUT ESOPHAGITIS: ICD-10-CM

## 2019-01-29 RX ORDER — DEXLANSOPRAZOLE 60 MG/1
CAPSULE, DELAYED RELEASE ORAL
Qty: 90 CAP | Refills: 3 | Status: SHIPPED | OUTPATIENT
Start: 2019-01-29 | End: 2020-11-23

## 2019-01-29 NOTE — TELEPHONE ENCOUNTER
Requested Prescriptions     Pending Prescriptions Disp Refills    dexlansoprazole (DEXILANT) 60 mg CpDB capsule (delayed release) 90 Cap 3     Sig: Take one po daily prn acid reflux

## 2019-02-06 DIAGNOSIS — F41.9 ANXIETY AND DEPRESSION: ICD-10-CM

## 2019-02-06 DIAGNOSIS — F32.A ANXIETY AND DEPRESSION: ICD-10-CM

## 2019-02-06 RX ORDER — FLUOXETINE HYDROCHLORIDE 20 MG/1
20 CAPSULE ORAL DAILY
Qty: 30 CAP | Refills: 1 | Status: SHIPPED | OUTPATIENT
Start: 2019-02-06 | End: 2019-02-08 | Stop reason: SDUPTHER

## 2019-02-06 NOTE — TELEPHONE ENCOUNTER
Patient request, last OV 1/24/19, no future appt scheduled. Requested Prescriptions     Pending Prescriptions Disp Refills    FLUoxetine (PROZAC) 20 mg capsule 30 Cap 1     Sig: Take 1 Cap by mouth daily.

## 2019-02-08 DIAGNOSIS — F31.9 BIPOLAR AFFECTIVE DISORDER, REMISSION STATUS UNSPECIFIED (HCC): Primary | ICD-10-CM

## 2019-02-08 DIAGNOSIS — F41.9 ANXIETY AND DEPRESSION: ICD-10-CM

## 2019-02-08 DIAGNOSIS — F32.A ANXIETY AND DEPRESSION: ICD-10-CM

## 2019-02-08 RX ORDER — OLANZAPINE 10 MG/1
TABLET ORAL
Qty: 90 TAB | Refills: 0 | Status: SHIPPED | OUTPATIENT
Start: 2019-02-08

## 2019-02-08 RX ORDER — FLUOXETINE HYDROCHLORIDE 20 MG/1
20 CAPSULE ORAL DAILY
Qty: 30 CAP | Refills: 0 | Status: SHIPPED | OUTPATIENT
Start: 2019-02-08

## 2019-02-08 NOTE — TELEPHONE ENCOUNTER
Pt contacted at home number. 2 pt identifiers confirmed. Pt informed that Prozac has been filled 02/06/2019 and was sent to [facility]. Pt states that [facility] not able to get into her building and she requested that medication be sent to [facility]. Also pt would like refill of Zyprexa, she states she was prescribed this medication by MD at the hospital, she has 5 pills left. Pt states she needs new referral for psych she saw Dr Frances Mcneal but she will not be returning because he was rude. Please be advised. Requested Prescriptions     Pending Prescriptions Disp Refills    FLUoxetine (PROZAC) 20 mg capsule 30 Cap 1     Sig: Take 1 Cap by mouth daily.     OLANZapine (ZYPREXA) 10 mg tablet  0

## 2019-02-08 NOTE — TELEPHONE ENCOUNTER
Sent electronically:    Requested Prescriptions     Signed Prescriptions Disp Refills    FLUoxetine (PROZAC) 20 mg capsule 30 Cap 0     Sig: Take 1 Cap by mouth daily. Authorizing Provider: Ines KIM    OLANZapine (ZYPREXA) 10 mg tablet 90 Tab 0     Sig: Take one po tid     Authorizing Provider: Ines KIM     Referral generated in [facility] for Dr. Afsaneh Wahl. She needs to contact him to make an appt.      [address]   Physician   Primary Contact Information     Phone Fax E-mail Address   [phone] Not available [address] [facility] [address] [facility] [address]

## 2019-02-08 NOTE — TELEPHONE ENCOUNTER
Patient has a question will Dr. Mariya Arvizu fill these medications for her please  Zyprexa 10 mg and Prozac 10 mg

## 2019-02-22 ENCOUNTER — DOCUMENTATION ONLY (OUTPATIENT)
Dept: INTERNAL MEDICINE CLINIC | Age: 64
End: 2019-02-22

## 2019-02-22 NOTE — PROGRESS NOTES
received a call From [facility] with  [facility]  She stated she had spoken with our patient Bee Man and had  done a depression screen on the patient over the phone and she scored a 13. I called the patient to check to see if she was okay . 2 patient identifiers confirmed. Patient sounded upbeat and was very glad I called to check up on her . Patient  Stated she felt fine and had talked to [employer] over  the phone 02/21/19 about her deductible. She stated she had no thoughts of hurting herself or anyone else at this time.

## 2019-05-21 DIAGNOSIS — M85.80 OSTEOPENIA, UNSPECIFIED LOCATION: Primary | ICD-10-CM

## 2019-05-28 NOTE — TELEPHONE ENCOUNTER
PT was given message and she says thank you! Information: Selecting Yes will display possible errors in your note based on the variables you have selected. This validation is only offered as a suggestion for you. PLEASE NOTE THAT THE VALIDATION TEXT WILL BE REMOVED WHEN YOU FINALIZE YOUR NOTE. IF YOU WANT TO FAX A PRELIMINARY NOTE YOU WILL NEED TO TOGGLE THIS TO 'NO' IF YOU DO NOT WANT IT IN YOUR FAXED NOTE.

## 2019-09-11 NOTE — ADDENDUM NOTE
Addended by: Phyllis Alejandre on: 6/24/2017 06:43 PM     Modules accepted: Orders NEW PATIENT SPECIALTY PHARMACY CONSULTATION     Situation  Spoke to patient today regarding the new medication, Kevzara (sarilumab), to set up delivery and provide consultation.     Start of Therapy Date: Patient is to start therapy on 9/12/2019  Is patient getting injection training: No   If yes, from where: MD Office    Background    Verify Allergies: ALLERGIES:  Levaquin; Methotrexate; Methylprednisolone; and Naproxen    Verify Medication list:   Current Outpatient Medications   Medication Sig   • KEVZARA 200 MG/1.14ML Solution Prefilled Syringe Inject 1 syringe into the skin every 14 days.   • HYDROcodone-acetaminophen (NORCO) 5-325 MG per tablet Take 1 tablet by mouth every 8 hours as needed for Pain. Do not start before September 4, 2019.   • traMADol (ULTRAM) 50 MG tablet TAKE 1 TABLET BY MOUTH THREE TIMES DAILY AS NEEDED FOR PAIN   • OTREXUP 25 MG/0.4ML Solution Auto-injector INJECT 25MG (1 PEN) UNDER THE SKIN EVERY 7 DAYS   • zaleplon (SONATA) 10 MG capsule TAKE 1 CAPSULE BY MOUTH AT BEDTIME AS NEEDED FOR SLEEP   • HP ACTHAR 80 UNIT/ML injectable gel    • ondansetron (ZOFRAN) 4 MG tablet TK 1 T PO TID   • fluoxetine (PROZAC) 20 MG tablet TAKE 1 TABLET BY MOUTH DAILY WITH 10MG FOR TOTAL OF 30MG   • ibuprofen (MOTRIN) 800 MG tablet TAKE 1 TABLET BY MOUTH THREE TIMES DAILY AS NEEDED FOR PAIN   • trihexyphenidyl (ARTANE) 2 MG tablet TAKE 1 TABLET BY MOUTH THREE TIMES DAILY(DUE FOR FOLLOW UP APPOINTMENT)   • Ascorbic Acid (VITAMIN C PO)    • predniSONE (DELTASONE) 5 MG tablet Take 5 mg by mouth daily. 6tabs x3days, 5tabs x3days, 4tabs x3days, 3tabs x3days, 2tabs x3days, 1tab x3 days, then discontinue prednisone, 63 tab(s)   • fluoxetine (PROZAC) 10 MG tablet TAKE 1 TABLET BY MOUTH DAILY   • ondansetron (ZOFRAN ODT) 4 MG disintegrating tablet Take 1 tablet by mouth every 8 hours as needed for Nausea.   • Ferrous Sulfate (IRON) 325 (65 FE) MG TABS Take 1 tablet by mouth daily.   • Cholecalciferol (VITAMIN D3) 2000  UNIT TABS Take 1 tablet by mouth daily.   • Daily Multiple Vitamins TABS Multivitamin with Folic Acid, one daily.     No current facility-administered medications for this visit.        Drug Interactions Identified?       No    - Avoid live vaccines with Kevzara    Drug-Food Interactions:   - No food interactions    Screen Medications for ISMP high alert drugs   Medication profile DOES include ISMP high alert drugs: Norco, tramadol Patient consulted to reduce risk associated with high alert meds.      Assessment    Purpose of treatment (indication): Rheumatoid Arthritis (RA)    Dose appropriate for renal function: Yes   Dose appropriate for hepatic function: Yes   Did patient have TB test done: Yes (next one due 10/29/2019)  Baseline CMP and CDC done: Yes   Baseline lipid panel done: Yes     Warnings and Precautions  - [US BOXED WARNING] Risk of Serious Infections: Kevzara can lower your ability to fight infections. Report to your doctor if you think you have an infection or have symptoms of an infection, with or without a fever: sweats, chills, muscle aches, cough, shortness of breath, blood in your phlegm, weight loss, warm/red/painful skin sores, diarrhea or stomach pain, burning when you urinate and feeling very tired.   o Get tested for latent tuberculosis (TB) prior to initiating Kevzara. If positive, start treatment for TB prior to Kevzara use.  - Laboratory abnormalities:  o Hematologic effects: Your neutrophils and your platelets may become too low. Your ability to fight off infections and ability to clot may decrease. Report any fevers, signs of infections, bleeding and unexplained bruising.  o Hepatotoxicity: Your liver enzymes may become elevated. Report any signs of liver injury such as yellowing of your skin or whites of your eyes, abdominal pain, nausea, vomiting and loss of appetite.  o Hyperlipidemia: Your cholesterol may go up while on Kevzara.   - Your doctor may check your labs at baseline, 4-8  weeks after initiation and every 6 months thereafter.   - Your dose may be held or adjusted based on your laboratory results.  - Hypersensitivity reaction: Seek immediate medical attention in case of a severe allergic reaction such as shortness of breath, trouble breathing, swelling of lips/tongue/face, feeling dizzy of faint, chest pain and moderate or severe stomach pain or vomiting.  - Gastrointestinal perforation: Some people may get tears in their stomach. This happens most often in people who also takes NSAIDs, corticosteroids or methotrexate, Report to your doctor right away if you have a fever and stomach pain that does not go away.  - Malignancy: Kevzara may increase risk of certain cancers.     Fertility, Pregnancy and Lactation  - Pregnancy:  o There is limited data on Kevzara use in pregnant women. It is suggested that Kevzara may delay childbirth if given while pregnant. The background risk of major birth defects and miscarriages are unknown.  o There is a pregnancy exposure registry that monitors pregnancy outcomes in women exposed to Kevzara during pregnancy. Physicians are encouraged to register patients and pregnant women are encouraged to register themselves by calling [phone].   - Lactation:  o  Discontinue drug or nursing taking into consideration importance of drug to mother    How to Administer (including dosing schedules if necessary)  How supplied:   ? 150 mg/1.14 mL prefilled syringe  ? 200 mg/1.14 mL prefilled syringe    Dosing: Inject 200 mg (1 prefilled syringe) under the skin once every 2 weeks    Administration:   Did not go over injection technique.    Missed Doses  If you miss a dose, take it as soon as you remember. If it is getting closer to your next schedule dose, skip it, and go back to your regular dosing schedule. Do not double up to make up for a missed dose.    Toxicities (Side Effects)  - Decreased blood counts (neutrophils and platelets), increased ALT (liver  enzyme), injection site redness, upper respiratory infections and urinary tract infections    Safe Handling and Disposal  - Keep in the refrigerator when not in use. Do not freeze.  - Store in the original carton to protect from light when not in use.  - If needed, Kevzara may be stored at room temperature up to 77°F (25°C) up to 14 days in the outer carton. Do not store above 77°F (25°C). After removal from the refrigerator, use Kevzara within 14 days or discard.  - Dispose used prefilled syringes in an FDA-approved sharps disposal container.    Educational Materials Provided   ? VIA Patient Education Materials   ? [facility] Customer Information Handbook   ? Notice of Privacy Practices   ? Instructions for Use    Additional supplies provided  ? Sharps container: Dispose used needles/syringes/pens on this container. Once about ¾ full, you may exchange it for a new container at any [facility] locations.  ? Alcohol swabs: Use to clean and disinfect the area of injection and let dry prior to injection.    List relevant caregivers that can be contacted and/or medication information be discussed    Name Relation Phone Number            Literacy and Adherence Assessment  We assessed patient’s potential for non-adherence using the 4 question [place] tool. The patient’s responses shown below indicate high probability of adherence. (one or more yes responses is a potential sign of low adherence to medication).      1. Do you ever forget to take your medicine? No    2. Do you ever have problems remembering to take your medication?   No    3. When you feel better, do you sometimes stop taking your medication?  No    4. Sometimes if you feel worse when you take your medicine, do you stop taking it? No    We assessed patient’s health literacy with the brief 3 question literacy tool.      1. How often do you have someone (like a family member, friend, hospital/clinic worker, or caregiver) help you read  [facility] via [employer] and estimated delivery on 9/12/2019.  11. Patient has [facility] phone number, [phone], for questions and concerns.   12. Patient acknowledges and agrees with the plan of care.     Ana Mccormack, DPH-4  [facility]

## 2019-09-17 ENCOUNTER — TELEPHONE (OUTPATIENT)
Dept: INTERNAL MEDICINE CLINIC | Age: 64
End: 2019-09-17

## 2019-09-17 NOTE — TELEPHONE ENCOUNTER
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

## 2019-09-23 NOTE — TELEPHONE ENCOUNTER
Patient contacted at home number. 2 patient identifiers confirmed. Patient wanted to know when she had her last physical at this office. Patient was advised that since she established care 07/2018 we do not have a physical appointment on file for her. Patient states she has paper work that she needs to have Dr Brandy Krishnan to fill out for her job. Patient was advised that MD does not have any available appointments at this time. She will have to be seen by MD in Jan 2020. Patient states she can possibly be seen by PA in December 2019 if she chooses to. Patient states she will call be back to be seen by Dr Brandy Krishnan when schedule is open. No other questions or concerns at this time.

## 2019-11-11 ENCOUNTER — OFFICE VISIT (OUTPATIENT)
Dept: FAMILY MEDICINE CLINIC | Age: 64
End: 2019-11-11

## 2019-11-11 VITALS
OXYGEN SATURATION: 98 % | HEIGHT: 65 IN | WEIGHT: 180 LBS | HEART RATE: 76 BPM | SYSTOLIC BLOOD PRESSURE: 127 MMHG | TEMPERATURE: 96.6 F | DIASTOLIC BLOOD PRESSURE: 80 MMHG | RESPIRATION RATE: 12 BRPM | BODY MASS INDEX: 29.99 KG/M2

## 2019-11-11 DIAGNOSIS — K21.9 GASTROESOPHAGEAL REFLUX DISEASE WITHOUT ESOPHAGITIS: ICD-10-CM

## 2019-11-11 DIAGNOSIS — F31.81 BIPOLAR 2 DISORDER (HCC): ICD-10-CM

## 2019-11-11 DIAGNOSIS — I48.0 PAROXYSMAL ATRIAL FIBRILLATION (HCC): ICD-10-CM

## 2019-11-11 DIAGNOSIS — I10 ESSENTIAL HYPERTENSION: Primary | ICD-10-CM

## 2019-11-11 RX ORDER — APIXABAN 5 MG/1
TABLET, FILM COATED ORAL
Refills: 5 | COMMUNITY
Start: 2019-10-26

## 2019-11-11 NOTE — PROGRESS NOTES
1. Have you been to the ER, urgent care clinic since your last visit? Hospitalized since your last visit? No    2. Have you seen or consulted any other health care providers outside of the [address] [place] [address] since your last visit? Include any pap smears or colon screening.  No

## 2019-11-11 NOTE — PROGRESS NOTES
[facility]    CC: EOC    HPI:     Bipolar II:  -Seeing Dr. Lina Dunbar for issue  -Has been worsening  -Last saw 2 months ago  -Next appointment is in December  -Taking psychiatric medication as prescribed  -Denies any side effects or issues with her medication      HTN:  -On no medication for issue  -Does no check BP at home  -Not following any regular exercise regimen  -Diet is unrestricted      GERD:  -Taking Dexilant as needed for issue  -Denies any side effects or issues with the medication  -Reports that medication works well to       PAF:  -Taking Eliquis for issue  -Denies any side effects or issues with the medication    ROS: Positive items marked in RED  CON: fever, chills  Cardiovascular: palpitations, CP  Resp: SOB, cough  GI: nausea, vomiting, diarrhea  : dysuria, hematuria      Past Medical History:   Diagnosis Date    Alcohol abuse     Bipolar disorder ([facility] [address]     Bladder cancer ([facility] [address] 04/2011    Clinical stage Ta, well differentiated urothelial carcinoma of the bladder s/p TURBT. Dr. Pasha Drake C4 cervical fracture [facility])     C5 spinous process, C4 inferior fracture    Cervical radiculopathy 01/2018    Cervical stenosis of spine     severe stenosis behind C4-C5-C6 along with severe neural foraminal stenosis on left C5 and C6 nerve root.  Depression     Diverticulosis 01/2018    FH: breast cancer     Hematuria, microscopic     Hypercholesteremia     Hypertension     Nasal bone fracture 01/2018    nasal bone fractures, left greater than right.  Bony nasal septum deformity, age indeterminate, possibly acute    Osteopenia     PAF (paroxysmal atrial fibrillation) ([facility] [address] 01/2019    Seizures ([facility] [address] 01/23/2018    Vitamin D deficiency        Past Surgical History:   Procedure Laterality Date    CYSTOSCOPY  4/27/12    ENDOSCOPY, COLON, DIAGNOSTIC      HX BREAST AUGMENTATION  2001    flakito    HX CERVICAL FUSION  01/2018    anterior decompression fusion from C4-C7    HX UROLOGICAL Family History   Problem Relation Age of Onset    Cancer Mother         breast    Stroke Mother     Cancer Father         lung    Cancer Sister        Social History     Socioeconomic History    Marital status:      Spouse name: Not on file    Number of children: Not on file    Years of education: Not on file    Highest education level: Not on file   Tobacco Use    Smoking status: Never Smoker    Smokeless tobacco: Never Used    Tobacco comment: continue to not smoke   Substance and Sexual Activity    Alcohol use: Yes     Alcohol/week: 23.3 standard drinks     Types: 28 Glasses of wine per week     Comment: wine 2-4 glasses a day    Drug use: No    Sexual activity: Yes     Partners: Male       Allergies   Allergen Reactions    Omeprazole Nausea and Vomiting    Ciprofloxacin Unknown (comments)     Light headed    Codeine Nausea and Vomiting    Macrobid [Nitrofurantoin Monohyd/M-Cryst] Nausea and Vomiting         Current Outpatient Medications:     ELIQUIS 5 mg tablet, TK 1 T PO BID, Disp: , Rfl: 5    FLUoxetine (PROZAC) 20 mg capsule, Take 1 Cap by mouth daily. (Patient taking differently: Take 60 mg by mouth daily. Indications: Anxiousness associated with Depression), Disp: 30 Cap, Rfl: 0    OLANZapine (ZYPREXA) 10 mg tablet, Take one po tid (Patient taking differently: Take 10 mg by mouth nightly.  Take one po tid), Disp: 90 Tab, Rfl: 0    dexlansoprazole (DEXILANT) 60 mg CpDB capsule (delayed release), Take one po daily prn acid reflux, Disp: 90 Cap, Rfl: 3    Physical Exam:      /80   Pulse 76   Temp 96.6 °F (35.9 °C) (Oral)   Resp 12   Ht 5' 5\" (1.651 m)   Wt 180 lb (81.6 kg)   SpO2 98%   BMI 29.95 kg/m²     General:  WD, WN, NAD, conversant  Eyes: sclera clear bilaterally, no discharge noted, eyelids normal in appearance  HENT: NCAT  Lungs: CTAB, normal respiratory effort and rate  CV: RRR, no MRGs  ABD: soft, non-tender, non-distended, normal bowel sounds  Skin: normal temperature, turgor, color, and texture  Psych: alert and oriented to person, place and situation, normal affect  Neuro: speech normal, moving all extremities, gait normal    EKG (11/11/2019):  -NSR  -Unchanged from EKG on 3/31/2015    Assessment/Plan     Bipolar II:  -Will defer management to psychiatry  -F/U in one month      HTN:  -Goal BP unclear  -CBC, CMP, and fasting lipid panel ordered  -Plan to calculate ASCVD risk and discuss results at next visit  -F/U in one month      GERD:  -Will continue current Dexilant regimen  -F/U in one month      PAF:  -Will continue current Eliquis regimen  -F/U in one month        Jayla Ford MD  11/11/2019, 2:59 PM

## 2019-11-12 LAB
A-G RATIO,AGRAT: 1.7 RATIO (ref 1.1–2.6)
ALBUMIN SERPL-MCNC: 4.6 G/DL (ref 3.5–5)
ALP SERPL-CCNC: 78 U/L (ref 40–120)
ALT SERPL-CCNC: 9 U/L (ref 5–40)
ANION GAP SERPL CALC-SCNC: 16 MMOL/L
AST SERPL W P-5'-P-CCNC: 9 U/L (ref 10–37)
BILIRUB SERPL-MCNC: 0.2 MG/DL (ref 0.2–1.2)
BUN SERPL-MCNC: 12 MG/DL (ref 6–22)
CALCIUM SERPL-MCNC: 9.5 MG/DL (ref 8.4–10.5)
CHLORIDE SERPL-SCNC: 100 MMOL/L (ref 98–110)
CHOLEST SERPL-MCNC: 275 MG/DL (ref 110–200)
CO2 SERPL-SCNC: 28 MMOL/L (ref 20–32)
CREAT SERPL-MCNC: 0.8 MG/DL (ref 0.8–1.4)
ERYTHROCYTE [DISTWIDTH] IN BLOOD BY AUTOMATED COUNT: 16.1 % (ref 10–15.5)
GFRAA, 66117: >60
GFRNA, 66118: >60
GLOBULIN,GLOB: 2.7 G/DL (ref 2–4)
GLUCOSE SERPL-MCNC: 77 MG/DL (ref 70–99)
HCT VFR BLD AUTO: 35.4 % (ref 35.1–48)
HDLC SERPL-MCNC: 3.3 MG/DL (ref 0–5)
HDLC SERPL-MCNC: 83 MG/DL (ref 40–59)
HGB BLD-MCNC: 10.4 G/DL (ref 11.7–16)
LDLC SERPL CALC-MCNC: 170 MG/DL (ref 50–99)
MCH RBC QN AUTO: 24 PG (ref 26–34)
MCHC RBC AUTO-ENTMCNC: 29 G/DL (ref 31–36)
MCV RBC AUTO: 82 FL (ref 80–95)
PLATELET # BLD AUTO: 414 K/UL (ref 140–440)
PMV BLD AUTO: 11.5 FL (ref 9–13)
POTASSIUM SERPL-SCNC: 4.5 MMOL/L (ref 3.5–5.5)
PROT SERPL-MCNC: 7.3 G/DL (ref 6.2–8.1)
RBC # BLD AUTO: 4.32 M/UL (ref 3.8–5.2)
SODIUM SERPL-SCNC: 144 MMOL/L (ref 133–145)
TRIGL SERPL-MCNC: 108 MG/DL (ref 40–149)
VLDLC SERPL CALC-MCNC: 22 MG/DL (ref 8–30)
WBC # BLD AUTO: 7.9 K/UL (ref 4–11)

## 2019-12-03 NOTE — TELEPHONE ENCOUNTER
Please let pt know that we received fax from [facility] psychiatry that they will not see her because did not return phone calls. Does she have an appt. With another psychiatrist? If not, she should call [facility] psychiatry at [phone]. left total hip arthroplasty

## 2019-12-20 ENCOUNTER — OFFICE VISIT (OUTPATIENT)
Dept: FAMILY MEDICINE CLINIC | Age: 64
End: 2019-12-20

## 2019-12-20 VITALS
TEMPERATURE: 97.7 F | RESPIRATION RATE: 16 BRPM | BODY MASS INDEX: 30.32 KG/M2 | HEIGHT: 65 IN | OXYGEN SATURATION: 96 % | WEIGHT: 182 LBS | DIASTOLIC BLOOD PRESSURE: 52 MMHG | SYSTOLIC BLOOD PRESSURE: 83 MMHG | HEART RATE: 103 BPM

## 2019-12-20 DIAGNOSIS — Z00.00 WELL WOMAN EXAM (NO GYNECOLOGICAL EXAM): Primary | ICD-10-CM

## 2019-12-20 DIAGNOSIS — E78.5 HYPERLIPIDEMIA, UNSPECIFIED HYPERLIPIDEMIA TYPE: ICD-10-CM

## 2019-12-20 DIAGNOSIS — D64.9 ANEMIA, UNSPECIFIED TYPE: ICD-10-CM

## 2019-12-20 DIAGNOSIS — Z12.11 COLON CANCER SCREENING: ICD-10-CM

## 2019-12-20 RX ORDER — FERROUS FUMARATE 324(106)MG
1 TABLET ORAL DAILY
Qty: 90 TAB | Refills: 1 | Status: SHIPPED | OUTPATIENT
Start: 2019-12-20 | End: 2021-01-21

## 2019-12-20 RX ORDER — BUSPIRONE HYDROCHLORIDE 5 MG/1
TABLET ORAL
COMMUNITY
Start: 2019-12-03 | End: 2021-01-21

## 2019-12-20 NOTE — PATIENT INSTRUCTIONS
Well Visit, Ages 25 to 48: Care Instructions Your Care Instructions Physical exams can help you stay healthy. Your doctor has checked your overall health and may have suggested ways to take good care of yourself. He or she also may have recommended tests. At home, you can help prevent illness with healthy eating, regular exercise, and other steps. Follow-up care is a key part of your treatment and safety. Be sure to make and go to all appointments, and call your doctor if you are having problems. It's also a good idea to know your test results and keep a list of the medicines you take. How can you care for yourself at home? · Reach and stay at a healthy weight. This will lower your risk for many problems, such as obesity, diabetes, heart disease, and high blood pressure. · Get at least 30 minutes of physical activity on most days of the week. Walking is a good choice. You also may want to do other activities, such as running, swimming, cycling, or playing tennis or team sports. Discuss any changes in your exercise program with your doctor. · Do not smoke or allow others to smoke around you. If you need help quitting, talk to your doctor about stop-smoking programs and medicines. These can increase your chances of quitting for good. · Talk to your doctor about whether you have any risk factors for sexually transmitted infections (STIs). Having one sex partner (who does not have STIs and does not have sex with anyone else) is a good way to avoid these infections. · Use birth control if you do not want to have children at this time. Talk with your doctor about the choices available and what might be best for you. · Protect your skin from too much sun. When you're outdoors from 10 a.m. to 4 p.m., stay in the shade or cover up with clothing and a hat with a wide brim. Wear sunglasses that block UV rays. Even when it's cloudy, put broad-spectrum sunscreen (SPF 30 or higher) on any exposed skin. · See a dentist one or two times a year for checkups and to have your teeth cleaned. · Wear a seat belt in the car. Follow your doctor's advice about when to have certain tests. These tests can spot problems early. For everyone · Cholesterol. Have the fat (cholesterol) in your blood tested after age 21. Your doctor will tell you how often to have this done based on your age, family history, or other things that can increase your risk for heart disease. · Blood pressure. Have your blood pressure checked during a routine doctor visit. Your doctor will tell you how often to check your blood pressure based on your age, your blood pressure results, and other factors. · Vision. Talk with your doctor about how often to have a glaucoma test. 
· Diabetes. Ask your doctor whether you should have tests for diabetes. · Colon cancer. Your risk for colorectal cancer gets higher as you get older. Some experts say that adults should start regular screening at age 48 and stop at age 76. Others say to start before age 48 or continue after age 76. Talk with your doctor about your risk and when to start and stop screening. For women · Breast exam and mammogram. Talk to your doctor about when you should have a clinical breast exam and a mammogram. Medical experts differ on whether and how often women under 50 should have these tests. Your doctor can help you decide what is right for you. · Cervical cancer screening test and pelvic exam. Begin with a Pap test at age 24. The test often is part of a pelvic exam. Starting at age 27, you may choose to have a Pap test, an HPV test, or both tests at the same time (called co-testing). Talk with your doctor about how often to have testing. · Tests for sexually transmitted infections (STIs). Ask whether you should have tests for STIs. You may be at risk if you have sex with more than one person, especially if your partners do not wear condoms. For men · Tests for sexually transmitted infections (STIs). Ask whether you should have tests for STIs. You may be at risk if you have sex with more than one person, especially if you do not wear a condom. · Testicular cancer exam. Ask your doctor whether you should check your testicles regularly. · Prostate exam. Talk to your doctor about whether you should have a blood test (called a PSA test) for prostate cancer. Experts differ on whether and when men should have this test. Some experts suggest it if you are older than 39 and are -American or have a father or brother who got prostate cancer when he was younger than 72. When should you call for help? Watch closely for changes in your health, and be sure to contact your doctor if you have any problems or symptoms that concern you. Where can you learn more? Go to http://kasey-yamilet.info/. Enter P072 in the search box to learn more about \"Well Visit, Ages 25 to 48: Care Instructions. \" Current as of: December 13, 2018 Content Version: 12.2 © 4916-1616 "[employer]. Care instructions adapted under license by [employer] (which disclaims liability or warranty for this information). If you have questions about a medical condition or this instruction, always ask your healthcare professional. [address] any warranty or liability for your use of this information. Learning About High Cholesterol What is high cholesterol? Cholesterol is a type of fat in your blood. It is needed for many body functions, such as making new cells. Cholesterol is made by your body. It also comes from food you eat. If you have too much cholesterol, it starts to build up in your arteries. This is called hardening of the arteries, or atherosclerosis. High cholesterol raises your risk of a heart attack and stroke. There are different types of cholesterol. LDL is the \"bad\" cholesterol. High LDL can raise your risk for heart disease, heart attack, and stroke. HDL is the \"good\" cholesterol. High HDL is linked with a lower risk for heart disease, heart attack, and stroke. Your cholesterol levels help your doctor find out your risk for having a heart attack or stroke. How can you prevent high cholesterol? A heart-healthy lifestyle can help you prevent high cholesterol. This lifestyle helps lower your risk for a heart attack and stroke. · Eat heart-healthy foods. ? Eat fruits, vegetables, whole grains (like oatmeal), dried beans and peas, nuts and seeds, soy products (like tofu), and fat-free or low-fat dairy products. ? Replace butter, margarine, and hydrogenated or partially hydrogenated oils with olive and canola oils. (Canola oil margarine without trans fat is fine.) ? Replace red meat with fish, poultry, and soy protein (like tofu). ? Limit processed and packaged foods like chips, crackers, and cookies. · Be active. Exercise can improve your cholesterol level. Get at least 30 minutes of exercise on most days of the week. Walking is a good choice. You also may want to do other activities, such as running, swimming, cycling, or playing tennis or team sports. · Stay at a healthy weight. Lose weight if you need to. · Don't smoke. If you need help quitting, talk to your doctor about stop-smoking programs and medicines. These can increase your chances of quitting for good. How is high cholesterol treated? The goal of treatment is to reduce your chances of having a heart attack or stroke. The goal is not to lower your cholesterol numbers only. · You may make lifestyle changes, such as eating healthy foods, not smoking, losing weight, and being more active. · You may have to take medicine. Follow-up care is a key part of your treatment and safety.  Be sure to make and go to all appointments, and call your doctor if you are having problems. It's also a good idea to know your test results and keep a list of the medicines you take. Where can you learn more? Go to http://kasey-yamilet.info/. Enter O030 in the search box to learn more about \"Learning About High Cholesterol. \" Current as of: April 9, 2019 Content Version: 12.2 © 5391-1649 [employer]. Care instructions adapted under license by [employer] (which disclaims liability or warranty for this information). If you have questions about a medical condition or this instruction, always ask your healthcare professional. [address] any warranty or liability for your use of this information. Iron Deficiency Anemia: Care Instructions Your Care Instructions Anemia means that you do not have enough red blood cells. Red blood cells carry oxygen around your body. When you have anemia, it can make you pale, weak, and tired. Many things can cause anemia. The most common cause is loss of blood. This can happen if you have heavy menstrual periods. It can also happen if you have bleeding in your stomach or bowel. You can also get anemia if you don't have enough iron in your diet or if it's hard for your body to absorb iron. In some cases, pregnancy causes anemia. That's because a pregnant woman needs more iron. Your doctor may do more tests to find the cause of your anemia. If a disease or other health problem is causing it, your doctor will treat that problem. It's important to follow up with your doctor to make sure that your iron level returns to normal. 
Follow-up care is a key part of your treatment and safety. Be sure to make and go to all appointments, and call your doctor if you are having problems. It's also a good idea to know your test results and keep a list of the medicines you take. How can you care for yourself at home? · If your doctor recommended iron pills, take them as directed. ? Try to take the pills on an empty stomach. You can do this about 1 hour before or 2 hours after meals. But you may need to take iron with food to avoid an upset stomach. ? Do not take antacids or drink milk or anything with caffeine within 2 hours of when you take your iron. They can keep your body from absorbing the iron well. ? Vitamin C helps your body absorb iron. You may want to take iron pills with a glass of orange juice or some other food high in vitamin C. 
? Iron pills may cause stomach problems. These include heartburn, nausea, diarrhea, constipation, and cramps. It can help to drink plenty of fluids and include fruits, vegetables, and fiber in your diet. ? It's normal for iron pills to make your stool a greenish or grayish black. But internal bleeding can also cause dark stool. So it's important to tell your doctor about any color changes. ? Call your doctor if you think you are having a problem with your iron pills. Even after you start to feel better, it will take several months for your body to build up its supply of iron. ? If you miss a pill, don't take a double dose. ? Keep iron pills out of the reach of small children. Too much iron can be very dangerous. · Eat foods with a lot of iron. These include red meat, shellfish, poultry, and eggs. They also include beans, raisins, whole-grain bread, and leafy green vegetables. · Steam your vegetables. This is the best way to prepare them if you want to get as much iron as possible. · Be safe with medicines. Do not take nonsteroidal anti-inflammatory pain relievers unless your doctor tells you to. These include aspirin, naproxen (Aleve), and ibuprofen (Advil, Motrin). · Liquid iron can stain your teeth. But you can mix it with water or juice and drink it with a straw. Then it won't get on your teeth. When should you call for help? Call 911 anytime you think you may need emergency care. For example, call if:   · You passed out (lost consciousness).  
 Call your doctor now or seek immediate medical care if: 
  · You are short of breath.  
  · You are dizzy or light-headed, or you feel like you may faint.  
  · You have new or worse bleeding.  
 Watch closely for changes in your health, and be sure to contact your doctor if: 
  · You feel weaker or more tired than usual.  
  · You do not get better as expected. Where can you learn more? Go to http://kasey-yamilet.info/. Enter O217 in the search box to learn more about \"Iron Deficiency Anemia: Care Instructions. \" Current as of: March 28, 2019 Content Version: 12.2 © 8196-3137 [employer]. Care instructions adapted under license by [employer] (which disclaims liability or warranty for this information). If you have questions about a medical condition or this instruction, always ask your healthcare professional. [place] [address] any warranty or liability for your use of this information. Iron-Rich Diet: Care Instructions Your Care Instructions Your body needs iron to make hemoglobin. Hemoglobin is a substance in red blood cells that carries oxygen from the lungs to cells all through your body. If you do not get enough iron, your body makes fewer and smaller red blood cells. As a result, your body's cells may not get enough oxygen. Adult men need 8 milligrams of iron a day; adult women need 18 milligrams of iron a day. After menopause, women need 8 milligrams of iron a day. A pregnant woman needs 27 milligrams of iron a day. Infants and young children have higher iron needs relative to their size than other age groups. People who have lost blood because of ulcers or heavy menstrual periods may become very low in iron and may develop anemia. Most people can get the iron their bodies need by eating enough of certain iron-rich foods.  Your doctor may recommend that you take an iron supplement along with eating an iron-rich diet. Follow-up care is a key part of your treatment and safety. Be sure to make and go to all appointments, and call your doctor if you are having problems. It's also a good idea to know your test results and keep a list of the medicines you take. How can you care for yourself at home? · Make iron-rich foods a part of your daily diet. Iron-rich foods include: ? All meats, such as chicken, beef, lamb, pork, fish, and shellfish. Liver is especially high in iron. ? Leafy green vegetables. ? Raisins, peas, beans, lentils, barley, and eggs. ? Iron-fortified breakfast cereals. · Eat foods with vitamin C along with iron-rich foods. Vitamin C helps you absorb more iron from food. Drink a glass of orange juice or another citrus juice with your food. · Eat meat and vegetables or grains together. The iron in meat helps your body absorb the iron in other foods. Where can you learn more? Go to http://kasey-yamilet.info/. Enter 0328 [id number] in the search box to learn more about \"Iron-Rich Diet: Care Instructions. \" Current as of: November 7, 2018 Content Version: 12.2 © 8022-4478 [employer]. Care instructions adapted under license by [employer] (which disclaims liability or warranty for this information). If you have questions about a medical condition or this instruction, always ask your healthcare professional. [place] [address] any warranty or liability for your use of this information.

## 2019-12-20 NOTE — PROGRESS NOTES
[facility]    CC: Well Woman Exam    HPI:     Well Woman Visit:  - 59 y.o.  legally    - Her gyn and breast care is done elsewhere by her Ob-Gyn physician.  - Menstrual Hx: Menarche: 13 y.o.. Menopause: 50 y.o.  - Gyn Hx: Last pap smear was this year ([facility])  - Sexual Hx: Active, Protection: None, Orientation: Heterosexual, Number of partners past year: One  - STD Hx: None  - Sun exposure: None. Using any sun protection: N/A  - Prenatal vitamins or calcium supplement: None  - Other celso providers seen: Gynecologist, Cardiologist, Psychiatrist, Neurologist, and Dentist  - Physical activity: No regular exercise regimen  - Diet: Unrestricted. Diet is \"Bad\", Dairy: Yes  - Caffeine: Drinks coke 3 times a day  - Safety: Wears seat belt in vehicles and has smoke detectors in home  - Reports she feels safe at home and in her neighborhood. ROS: Positive items marked in RED  CON: fever, chills  Eyes: eye pain, vision loss  ENT: ear pain, sore throat  Cardiovascular: CP, palpitations, swelling of lower extremities  Resp: cough, SOB  Lymph/Heme: swollen/enlarged lymph nodes, tender/painful lymph nodes  GI: nausea, vomiting, diarrhea, hematochezia, melena  SKIN: rash, itching, growth  Breast: lumps/bumps, pain, nipple changes/discharge  : dysuria, hematuria, abnormal vaginal discharge/bleeding  MS: arthralgias, myalgias  Neuro: numbness, weakness  Psych: depression, anxiety    Past Medical History:   Diagnosis Date    Alcohol abuse     [facility] disorder ([facility]     Bladder cancer ([facility] 2011    Clinical stage Ta, well differentiated urothelial carcinoma of the bladder s/p TURBT. Dr. Eloy Velazco C4 cervical fracture [facility])     C5 spinous process, C4 inferior fracture    Cervical radiculopathy 2018    Cervical stenosis of spine     severe stenosis behind C4-C5-C6 along with severe neural foraminal stenosis on left C5 and C6 nerve root.     Depression     Diverticulosis 01/2018    FH: breast cancer     Hematuria, microscopic     Hypercholesteremia     Hypertension     Nasal bone fracture 01/2018    nasal bone fractures, left greater than right. Bony nasal septum deformity, age indeterminate, possibly acute    Osteopenia     PAF (paroxysmal atrial fibrillation) ([facility] 01/2019    Seizures ([facility] 01/23/2018    Vitamin D deficiency        GYN History           OB History    No obstetric history on file. Past Surgical History:   Procedure Laterality Date    CYSTOSCOPY  4/27/12    ENDOSCOPY, COLON, DIAGNOSTIC      HX BREAST AUGMENTATION  2001    flakito    HX CERVICAL FUSION  01/2018    anterior decompression fusion from C4-C7    HX UROLOGICAL         Family History   Problem Relation Age of Onset    Cancer Mother         breast    Stroke Mother     Cancer Father         lung    Cancer Sister        Social History     Socioeconomic History    Marital status:      Spouse name: Not on file    Number of children: Not on file    Years of education: Not on file    Highest education level: Not on file   Tobacco Use    Smoking status: Never Smoker    Smokeless tobacco: Never Used    Tobacco comment: continue to not smoke   Substance and Sexual Activity    Alcohol use: Yes     Alcohol/week: 23.3 standard drinks     Types: 28 Glasses of wine per week     Comment: wine 2-4 glasses a day    Drug use: No    Sexual activity: Yes     Partners: Male       Allergies   Allergen Reactions    Omeprazole Nausea and Vomiting    Ciprofloxacin Unknown (comments)     Light headed    Codeine Nausea and Vomiting    Macrobid [Nitrofurantoin Monohyd/M-Cryst] Nausea and Vomiting         Current Outpatient Medications:     ELIQUIS 5 mg tablet, TK 1 T PO BID, Disp: , Rfl: 5    FLUoxetine (PROZAC) 20 mg capsule, Take 1 Cap by mouth daily. (Patient taking differently: Take 60 mg by mouth daily.  Indications: Anxiousness associated with Depression), Disp: 30 Cap, Rfl: 0    OLANZapine (ZYPREXA) 10 mg tablet, Take one po tid (Patient taking differently: Take 10 mg by mouth nightly. Take one po tid), Disp: 90 Tab, Rfl: 0    dexlansoprazole (DEXILANT) 60 mg CpDB capsule (delayed release), Take one po daily prn acid reflux, Disp: 90 Cap, Rfl: 3    Physical Exam:      BP (!) 83/52   Pulse (!) 103   Temp 97.7 °F (36.5 °C) (Oral)   Resp 16   Ht 5' 5\" (1.651 m)   Wt 182 lb (82.6 kg)   SpO2 96%   BMI 30.29 kg/m²     General:  Obese habitus, NAD, conversant  Eyes: sclera clear bilaterally, no discharge noted, eyelids normal in appearance, PERRLA, EOMI  HENT: NCAT, oropharynx clear, MMM  Neck: supple, midline trachea  Lungs: CTAB, Normal respiratory effort and rate  CV: RRR, no MRGs  ABD: soft, non-tender, non-distended, normal bowel sounds  Ext: no peripheral edema or digital cyanosis noted  Skin: normal temperature, turgor, color, and texture  Psych: alert and oriented to person, place and situation, normal affect  Neuro: speech normal, moving all extremities, gait normal,  5/5 strength in upper/lower extremities, sensation and CN II-XII grossly intact    *Breast and Pelvic exams were deferred*    Results for Saadia Bills (MRN [id number]):   Ref.  Range 11/11/2019 15:48   WBC Latest Ref Range: 4.0 - 11.0 K/uL 7.9   RBC Latest Ref Range: 3.80 - 5.20 M/uL 4.32   HGB Latest Ref Range: 11.7 - 16.0 g/dL 10.4 (L)   HCT Latest Ref Range: 35.1 - 48.0 % 35.4   MCV Latest Ref Range: 80 - 95 fL 82   MCH Latest Ref Range: 26 - 34 pg 24 (L)   MCHC Latest Ref Range: 31 - 36 g/dL 29 (L)   RDW Latest Ref Range: 10.0 - 15.5 % 16.1 (H)   PLATELET Latest Ref Range: 140 - 440 K/uL 414   MPV Latest Ref Range: 9.0 - 13.0 fL 11.5   Sodium Latest Ref Range: 133 - 145 mmol/L 144   Potassium Latest Ref Range: 3.5 - 5.5 mmol/L 4.5   Chloride Latest Ref Range: 98 - 110 mmol/L 100   CO2 Latest Ref Range: 20 - 32 mmol/L 28   Anion gap Latest Units: mmol/L 16.0   Glucose Latest Ref Range: 70 - 99 mg/dL 77 BUN Latest Ref Range: 6 - 22 mg/dL 12   Creatinine Latest Ref Range: 0.8 - 1.4 mg/dL 0.8   Calcium Latest Ref Range: 8.4 - 10.5 mg/dL 9.5   Bilirubin, total Latest Ref Range: 0.2 - 1.2 mg/dL 0.2   Protein, total Latest Ref Range: 6.2 - 8.1 g/dL 7.3   Albumin Latest Ref Range: 3.5 - 5.0 g/dL 4.6   Globulin Latest Ref Range: 2.0 - 4.0 g/dL 2.7   A-G Ratio Latest Ref Range: 1.1 - 2.6 ratio 1.7   ALT (SGPT) Latest Ref Range: 5 - 40 U/L 9   AST Latest Ref Range: 10 - 37 U/L 9 (L)   Alk.  phosphatase Latest Ref Range: 40 - 120 U/L 78   Triglyceride Latest Ref Range: 40 - 149 mg/dL 108   Cholesterol, total Latest Ref Range: 110 - 200 mg/dL 275 (H)   HDL Cholesterol Latest Ref Range: 40 - 59 mg/dL 83 (H)   CHOLESTEROL/HDL Latest Ref Range: 0.0 - 5.0  3.3   VLDL, calculated Latest Ref Range: 8 - 30 mg/dL 22   LDL, calculated Latest Ref Range: 50 - 99 mg/dL 170 (H)       Assessment/Plan     Well Woman Visit:  -Counseled on hyperlipidemia, anemia, ASCVD risk, AHA/ACC recommendations, and recommend lifestyle changes  -10-year ASCVD risk calculated to be 2.5%  -Patient likely with iron deficiency anemia  -Ferritin and iron profile ordered  -Started on iron supplement  -Referred to GI for colon cancer screening  -Handouts given on well visit care, hyperlipidemia care, iron deficiency anemia care, and iron rich diet  -F/U in 3 months        Ricky Flynn MD  12/20/2019, 1:12 PM

## 2019-12-20 NOTE — PROGRESS NOTES
1. Have you been to the ER, urgent care clinic since your last visit? Hospitalized since your last visit? No    2. Have you seen or consulted any other health care providers outside of the [address] [place] [address] since your last visit? Include any pap smears or colon screening.  No

## 2019-12-21 LAB
FE % SATURATION,PSAT: 6 % (ref 20–50)
FERRITIN SERPL-MCNC: 5 NG/ML (ref 10–291)
IRON,IRN: 23 MCG/DL (ref 30–160)
TIBC,TIBC: 371 MCG/DL (ref 228–428)
UIBC SERPL-MCNC: 348 MCG/DL (ref 110–370)

## 2020-10-20 NOTE — TELEPHONE ENCOUNTER
Hospitalist Patient called back and requested a behavioral medication. I informed the patient that none of the doctors here are available to give any new medications for Dr. Sheryle Alliance' patients and that she would have to wait until Dr. Sheryle Alliance comes back from being on vacation or go to the Emergency Room. She verbalized understanding and stated that she wanted to get an anti-itch medication, that Dr. Tyra Orourke prescribed a long time ago, and she needs it for bumps on her bottom that are very itchy. Patient states she hasn't changed her detergents and it may be to the leggings she wears. Patient is also requesting Dexilant for acid reflux. Patient states she cannot take omeprazole due to it makes her vomit. Patient would like the medication sent to [place].

## 2024-08-27 ENCOUNTER — OFFICE VISIT (OUTPATIENT)
Age: 69
End: 2024-08-27
Payer: MEDICARE

## 2024-08-27 VITALS
OXYGEN SATURATION: 95 % | RESPIRATION RATE: 20 BRPM | HEART RATE: 112 BPM | HEIGHT: 66 IN | TEMPERATURE: 97.3 F | DIASTOLIC BLOOD PRESSURE: 72 MMHG | SYSTOLIC BLOOD PRESSURE: 128 MMHG | BODY MASS INDEX: 31.21 KG/M2 | WEIGHT: 194.2 LBS

## 2024-08-27 DIAGNOSIS — R06.02 EXERTIONAL SHORTNESS OF BREATH: ICD-10-CM

## 2024-08-27 DIAGNOSIS — I48.0 PAROXYSMAL ATRIAL FIBRILLATION (HCC): Primary | ICD-10-CM

## 2024-08-27 DIAGNOSIS — Z79.01 LONG TERM (CURRENT) USE OF ANTICOAGULANTS: ICD-10-CM

## 2024-08-27 DIAGNOSIS — R94.31 ABNORMAL ECG: ICD-10-CM

## 2024-08-27 DIAGNOSIS — I95.2 HYPOTENSION DUE TO DRUGS: ICD-10-CM

## 2024-08-27 DIAGNOSIS — R01.1 SYSTOLIC MURMUR: ICD-10-CM

## 2024-08-27 DIAGNOSIS — I20.89 ATYPICAL ANGINA (HCC): ICD-10-CM

## 2024-08-27 PROCEDURE — G8400 PT W/DXA NO RESULTS DOC: HCPCS | Performed by: INTERNAL MEDICINE

## 2024-08-27 PROCEDURE — 1123F ACP DISCUSS/DSCN MKR DOCD: CPT | Performed by: INTERNAL MEDICINE

## 2024-08-27 PROCEDURE — 1036F TOBACCO NON-USER: CPT | Performed by: INTERNAL MEDICINE

## 2024-08-27 PROCEDURE — 3078F DIAST BP <80 MM HG: CPT | Performed by: INTERNAL MEDICINE

## 2024-08-27 PROCEDURE — G8417 CALC BMI ABV UP PARAM F/U: HCPCS | Performed by: INTERNAL MEDICINE

## 2024-08-27 PROCEDURE — 1090F PRES/ABSN URINE INCON ASSESS: CPT | Performed by: INTERNAL MEDICINE

## 2024-08-27 PROCEDURE — G8427 DOCREV CUR MEDS BY ELIG CLIN: HCPCS | Performed by: INTERNAL MEDICINE

## 2024-08-27 PROCEDURE — 3074F SYST BP LT 130 MM HG: CPT | Performed by: INTERNAL MEDICINE

## 2024-08-27 PROCEDURE — 99215 OFFICE O/P EST HI 40 MIN: CPT | Performed by: INTERNAL MEDICINE

## 2024-08-27 PROCEDURE — 3017F COLORECTAL CA SCREEN DOC REV: CPT | Performed by: INTERNAL MEDICINE

## 2024-08-27 RX ORDER — METHOCARBAMOL 500 MG/1
1000 TABLET, FILM COATED ORAL 4 TIMES DAILY PRN
COMMUNITY
Start: 2024-08-03 | End: 2024-08-27

## 2024-08-27 RX ORDER — ACETAMINOPHEN 500 MG
500-1000 TABLET ORAL EVERY 6 HOURS PRN
COMMUNITY
Start: 2024-08-03 | End: 2024-08-27

## 2024-08-27 RX ORDER — LIDOCAINE 50 MG/G
PATCH TOPICAL
COMMUNITY
Start: 2024-08-03 | End: 2024-08-27

## 2024-08-27 RX ORDER — FLUOXETINE 10 MG/1
20 TABLET, FILM COATED ORAL DAILY
COMMUNITY

## 2024-08-27 RX ORDER — LISINOPRIL 5 MG/1
5 TABLET ORAL DAILY
COMMUNITY
Start: 2012-03-07

## 2024-08-27 ASSESSMENT — PATIENT HEALTH QUESTIONNAIRE - PHQ9
2. FEELING DOWN, DEPRESSED OR HOPELESS: NOT AT ALL
SUM OF ALL RESPONSES TO PHQ QUESTIONS 1-9: 0
SUM OF ALL RESPONSES TO PHQ QUESTIONS 1-9: 0
3. TROUBLE FALLING OR STAYING ASLEEP: NOT AT ALL
SUM OF ALL RESPONSES TO PHQ QUESTIONS 1-9: 0
SUM OF ALL RESPONSES TO PHQ QUESTIONS 1-9: 0
SUM OF ALL RESPONSES TO PHQ9 QUESTIONS 1 & 2: 0
1. LITTLE INTEREST OR PLEASURE IN DOING THINGS: NOT AT ALL

## 2024-08-27 ASSESSMENT — ENCOUNTER SYMPTOMS
SHORTNESS OF BREATH: 0
ALLERGIC/IMMUNOLOGIC NEGATIVE: 1
GASTROINTESTINAL NEGATIVE: 1
EYES NEGATIVE: 1

## 2024-08-27 NOTE — PROGRESS NOTES
Carrie Pantoja (:  1955) is a 68 y.o. female,Established patient, here for evaluation of the following chief complaint(s):  Follow-up      Subjective   SUBJECTIVE/OBJECTIVE:  HPI  Patient presents today for follow-up.  I recently saw her in late  for new onset atrial fibrillation. The patient has a history of bladder cancer, bipolar disorder, depression, and alcohol abuse. Recent echocardiogram demonstrated normal EF of 64%, grade 1 diastolic dysfunction, mild tricuspid regurgitation, and pulmonary pressures of 26 mmHg. The patient was placed on flecainide and Eliquis. During her hospitalization, she converted to a normal sinus rhythm.           Today, she is doing relatively well.  She is taking her flecainide as prescribed.  She is fully anticoagulated.  No acute issues at this time.  Minimal palpitations.  No syncope.  No orthopnea.  She is able to ambulate with some restriction regarding arthritic changes but otherwise stable        I personally reviewed all available medical records, previous office notes, radiology reports and all available laboratory studies and procedural reports.    Past Medical History:   Diagnosis Date    Alcohol abuse     Bipolar disorder ([facility])     Bladder cancer ([facility]) 2011    Clinical stage Ta, well differentiated urothelial carcinoma of the bladder s/p TURBT. Dr. Driver    C4 cervical fracture ([facility])     C5 spinous process, C4 inferior fracture    Cervical radiculopathy 2018    Cervical stenosis of spine     severe stenosis behind C4-C5-C6 along with severe neural foraminal stenosis on left C5 and C6 nerve root.    Depression     Diverticulosis 2018    FH: breast cancer     Hematuria, microscopic     Hypercholesteremia     Hypertension     Nasal bone fracture 2018    nasal bone fractures, left greater than right. Bony nasal septum deformity, age indeterminate, possibly acute    Osteopenia     PAF (paroxysmal atrial fibrillation) ([facility]) 2019    Seizures ([facility])     Drug use: Not Currently        Family History   Problem Relation Age of Onset    Stroke Mother     Cancer Mother         breast    Cancer Sister     Cancer Father         lung        Review of Systems   Constitutional: Negative.    HENT: Negative.     Eyes: Negative.    Respiratory:  Negative for shortness of breath.    Cardiovascular:  Negative for chest pain and palpitations.   Gastrointestinal: Negative.    Endocrine: Negative.    Genitourinary: Negative.    Allergic/Immunologic: Negative.    Neurological: Negative.    Hematological: Negative.    Psychiatric/Behavioral: Negative.       Physical Exam  Vitals and nursing note reviewed.   Constitutional:       Appearance: Normal appearance.   HENT:      Head: Normocephalic and atraumatic.      Right Ear: External ear normal.      Left Ear: External ear normal.      Nose: Nose normal.      Mouth/Throat:      Mouth: Mucous membranes are dry.      Pharynx: Oropharynx is clear.   Eyes:      Extraocular Movements: Extraocular movements intact.      Conjunctiva/sclera: Conjunctivae normal.      Pupils: Pupils are equal, round, and reactive to light.   Neck:      Thyroid: No thyroid mass.      Vascular: Carotid bruit present. No JVD.      Trachea: Trachea normal.   Cardiovascular:      Rate and Rhythm: Normal rate and regular rhythm.      Pulses:           Carotid pulses are 1+ on the right side and 1+ on the left side.       Radial pulses are 1+ on the right side and 1+ on the left side.        Femoral pulses are 1+ on the right side and 1+ on the left side.       Popliteal pulses are 1+ on the right side and 1+ on the left side.        Dorsalis pedis pulses are 1+ on the right side and 1+ on the left side.        Posterior tibial pulses are 1+ on the right side and 1+ on the left side.      Heart sounds: S1 normal and S2 normal. Murmur (apex) heard.      Crescendo decrescendo systolic murmur is present with a grade of 2/6.      Gallop present. S4 sounds present.

## 2024-08-27 NOTE — PROGRESS NOTES
1. \"Have you been to the ER, urgent care clinic since your last visit?  Hospitalized since your last visit?\" Reviewed by Dr. Vishnu Hinton    2. \"Have you seen or consulted any other health care providers outside of the [facility] since your last visit?\" Reviewed by Dr. Vishnu Hinton

## 2024-10-11 ENCOUNTER — TELEPHONE (OUTPATIENT)
Age: 69
End: 2024-10-11

## 2024-10-11 NOTE — TELEPHONE ENCOUNTER
Incoming call from Carrie Pantoja, two pt identifiers verified, name and  for Carrie Pantoja.     She states,\"I received my echocardiogram results by mail, can you explain it to me?\"    Informed that Dr. Hinton reviewed her ECHO results and states the following below:    Vishnu Hinton Sr., MD  2024 12:33 PM EDT   Normal heart function.  Minimal increase in wall thickness.  No new recommendations at this time.  Informed the patient       Carrie Pantoja voiced understanding.

## 2025-03-14 DIAGNOSIS — R06.02 EXERTIONAL SHORTNESS OF BREATH: ICD-10-CM

## 2025-03-14 DIAGNOSIS — R94.31 ABNORMAL EKG: ICD-10-CM

## 2025-03-14 DIAGNOSIS — I20.89 ATYPICAL ANGINA: ICD-10-CM

## 2025-03-14 DIAGNOSIS — I48.0 PAROXYSMAL ATRIAL FIBRILLATION (HCC): Primary | ICD-10-CM

## 2025-03-30 NOTE — PROGRESS NOTES
Carrie Pantoja (:  1955) is a 69 y.o. female,Established patient, here for evaluation of the following chief complaint(s):  Follow-up (7month)    Subjective   SUBJECTIVE/OBJECTIVE:    History of Present Illness  The patient presents for evaluation of paroxysmal atrial fibrillation, shortness of breath, and obesity.  I recently saw her in late 2018 for new onset atrial fibrillation. The patient has a history of bladder cancer, bipolar disorder, depression, and alcohol abuse. Recent echocardiogram demonstrated normal EF of 64%, grade 1 diastolic dysfunction, mild tricuspid regurgitation, and pulmonary pressures of 26 mmHg. The patient was placed on flecainide and Eliquis. During her hospitalization, she converted to a normal sinus rhythm.     She reports overall good health but experiences intermittent shortness of breath, approximately every third day, particularly during physical exertion such as walking. She is able to walk up to three blocks without difficulty. The episodes of dyspnea are brief, lasting only a few minutes, and are not a daily occurrence. She has been diagnosed with sleep apnea but has not been compliant with the prescribed CPAP therapy. Her pulmonologist has conducted two tests, including a chamber breathing test and a walking test, both of which yielded normal results.    An endoscopy was performed last week at [facility], where findings prompted the need for a CAT scan to check for a tear or ulcer.    She has recently ceased alcohol consumption, having previously consumed a bottle of wine daily. This habit was initiated following her divorce.    SOCIAL HISTORY  - Marital Status:   - Exercise: Walking, approximately three blocks  - Alcohol: Stopped drinking, previously consumed a bottle of wine a day    I have carefully reviewed all available medical records, previous office notes, lab, x-ray and procedure reports    Past Medical History:   Diagnosis Date    Alcohol abuse

## 2025-03-31 ENCOUNTER — OFFICE VISIT (OUTPATIENT)
Age: 70
End: 2025-03-31
Payer: MEDICARE

## 2025-03-31 VITALS
WEIGHT: 196.8 LBS | HEART RATE: 93 BPM | TEMPERATURE: 96.8 F | OXYGEN SATURATION: 96 % | SYSTOLIC BLOOD PRESSURE: 104 MMHG | DIASTOLIC BLOOD PRESSURE: 55 MMHG | BODY MASS INDEX: 31.63 KG/M2 | HEIGHT: 66 IN

## 2025-03-31 DIAGNOSIS — F32.A ANXIETY AND DEPRESSION: ICD-10-CM

## 2025-03-31 DIAGNOSIS — R01.1 SYSTOLIC MURMUR: ICD-10-CM

## 2025-03-31 DIAGNOSIS — F41.9 ANXIETY AND DEPRESSION: ICD-10-CM

## 2025-03-31 DIAGNOSIS — I48.0 PAROXYSMAL ATRIAL FIBRILLATION (HCC): Primary | ICD-10-CM

## 2025-03-31 DIAGNOSIS — Z79.01 LONG TERM (CURRENT) USE OF ANTICOAGULANTS: ICD-10-CM

## 2025-03-31 DIAGNOSIS — I20.89 ATYPICAL ANGINA: ICD-10-CM

## 2025-03-31 DIAGNOSIS — R94.31 ABNORMAL ECG: ICD-10-CM

## 2025-03-31 DIAGNOSIS — R06.02 EXERTIONAL SHORTNESS OF BREATH: ICD-10-CM

## 2025-03-31 DIAGNOSIS — I95.2 HYPOTENSION DUE TO DRUGS: ICD-10-CM

## 2025-03-31 PROCEDURE — 99214 OFFICE O/P EST MOD 30 MIN: CPT | Performed by: INTERNAL MEDICINE

## 2025-03-31 PROCEDURE — G8400 PT W/DXA NO RESULTS DOC: HCPCS | Performed by: INTERNAL MEDICINE

## 2025-03-31 PROCEDURE — 3017F COLORECTAL CA SCREEN DOC REV: CPT | Performed by: INTERNAL MEDICINE

## 2025-03-31 PROCEDURE — 3078F DIAST BP <80 MM HG: CPT | Performed by: INTERNAL MEDICINE

## 2025-03-31 PROCEDURE — G8417 CALC BMI ABV UP PARAM F/U: HCPCS | Performed by: INTERNAL MEDICINE

## 2025-03-31 PROCEDURE — 1126F AMNT PAIN NOTED NONE PRSNT: CPT | Performed by: INTERNAL MEDICINE

## 2025-03-31 PROCEDURE — G8427 DOCREV CUR MEDS BY ELIG CLIN: HCPCS | Performed by: INTERNAL MEDICINE

## 2025-03-31 PROCEDURE — 1090F PRES/ABSN URINE INCON ASSESS: CPT | Performed by: INTERNAL MEDICINE

## 2025-03-31 PROCEDURE — 3074F SYST BP LT 130 MM HG: CPT | Performed by: INTERNAL MEDICINE

## 2025-03-31 PROCEDURE — 1159F MED LIST DOCD IN RCRD: CPT | Performed by: INTERNAL MEDICINE

## 2025-03-31 PROCEDURE — 1123F ACP DISCUSS/DSCN MKR DOCD: CPT | Performed by: INTERNAL MEDICINE

## 2025-03-31 PROCEDURE — 1036F TOBACCO NON-USER: CPT | Performed by: INTERNAL MEDICINE

## 2025-03-31 RX ORDER — FERROUS SULFATE 325(65) MG
325 TABLET ORAL EVERY OTHER DAY
COMMUNITY
Start: 2025-01-22

## 2025-03-31 RX ORDER — RISEDRONATE SODIUM 150 MG/1
TABLET, FILM COATED ORAL
COMMUNITY
Start: 2025-02-25

## 2025-03-31 RX ORDER — FLECAINIDE ACETATE 100 MG/1
100 TABLET ORAL 2 TIMES DAILY
Qty: 180 TABLET | Refills: 3 | Status: SHIPPED | OUTPATIENT
Start: 2025-03-31

## 2025-03-31 ASSESSMENT — ENCOUNTER SYMPTOMS: SHORTNESS OF BREATH: 1

## 2025-03-31 ASSESSMENT — PATIENT HEALTH QUESTIONNAIRE - PHQ9
SUM OF ALL RESPONSES TO PHQ QUESTIONS 1-9: 0
2. FEELING DOWN, DEPRESSED OR HOPELESS: NOT AT ALL
SUM OF ALL RESPONSES TO PHQ QUESTIONS 1-9: 0
1. LITTLE INTEREST OR PLEASURE IN DOING THINGS: NOT AT ALL
SUM OF ALL RESPONSES TO PHQ QUESTIONS 1-9: 0
SUM OF ALL RESPONSES TO PHQ QUESTIONS 1-9: 0

## 2025-03-31 NOTE — PROGRESS NOTES
1. \"Have you been to the ER, urgent care clinic since your last visit?  Hospitalized since your last visit?\" Reviewed by Dr. Vishnu Hinton    2. \"Have you seen or consulted any other health care providers outside of the [facility] since your last visit?\" Reviewed by Dr. Vishun Hinton